# Patient Record
Sex: MALE | Race: ASIAN | NOT HISPANIC OR LATINO | Employment: OTHER | URBAN - METROPOLITAN AREA
[De-identification: names, ages, dates, MRNs, and addresses within clinical notes are randomized per-mention and may not be internally consistent; named-entity substitution may affect disease eponyms.]

---

## 2017-05-06 ENCOUNTER — OFFICE VISIT (OUTPATIENT)
Dept: URGENT CARE | Facility: URGENT CARE | Age: 82
End: 2017-05-06
Payer: COMMERCIAL

## 2017-05-06 VITALS
SYSTOLIC BLOOD PRESSURE: 142 MMHG | BODY MASS INDEX: 25.44 KG/M2 | TEMPERATURE: 96.8 F | HEART RATE: 78 BPM | WEIGHT: 140 LBS | OXYGEN SATURATION: 95 % | DIASTOLIC BLOOD PRESSURE: 79 MMHG

## 2017-05-06 DIAGNOSIS — R42 VERTIGO: Primary | ICD-10-CM

## 2017-05-06 PROCEDURE — 99213 OFFICE O/P EST LOW 20 MIN: CPT | Performed by: NURSE PRACTITIONER

## 2017-05-06 RX ORDER — MECLIZINE HYDROCHLORIDE 25 MG/1
25 TABLET ORAL EVERY 6 HOURS PRN
Qty: 30 TABLET | Refills: 1 | Status: SHIPPED | OUTPATIENT
Start: 2017-05-06 | End: 2019-06-04

## 2017-05-06 NOTE — MR AVS SNAPSHOT
After Visit Summary   5/6/2017    Sergey Felipe    MRN: [id number]           Patient Information     Date Of Birth          1935        Visit Information        Provider Department      5/6/2017 4:50 PM Kelly Johnson NP [facility]        Today's Diagnoses     Vertigo    -  1      Care Instructions      Vertigo (Unknown Cause)    In addition to helping with hearing, the inner ear is part of the balance center of your body. Problems with the inner ear can a false feeling of motion. This is called vertigo. Often, it feels as if you or the room is spinning. A vertigo attack may cause sudden nausea, vomiting and heavy sweating. Severe vertigo causes a loss of balance and can cause you to fall. During vertigo, small head movements and changes in body position will often make the symptoms worse. You may also have ringing in the ears called tinnitus.  An episode of vertigo may last seconds, minutes or hours. Once you are over the first episode, it may never come back. However, symptoms may return off and on.  The cause of your vertigo is not yet known. Possible causes of vertigo include:    Inflammation of the inner ear    Disease of the nerves to the inner ear    Movement of calcium particles in the inner ear    Poor blood flow to the balance centers of the brain    Migraine headaches  Home care    If symptoms are severe, rest quietly in bed. Change positions very slowly. There is usually one position that will feel best, such as lying on one side or lying on your back with your head slightly raised on pillows.    Do not drive a car or work with dangerous machinery until symptoms have been gone for at least one week.    Take medicine as prescribed to relieve your symptoms. Unless another medicine was prescribed for symptoms of nausea, vomiting, and dizziness, you may use over-the-counter motion sickness pills. Ask your pharmacist for suggestions.  Follow-up care  Follow up with your  "healthcare provider or as directed. If you are referred to a specialist or for testing, make the appointment promptly.  When to seek medical advice  Call your healthcare provider if any of the following occur:    Fever of 100.4 F (38 C) or higher, or as directed by your healthcare provider    Vertigo worsens or is not controlled by prescribed medicine     Repeated vomiting not relieved by prescribed medicine     Severe headache    Confusion    Weakness of an arm or leg or one side of the face    Difficulty with speech or vision    Loss of consciousness     Seizure    5063-4847 [employer]. [address] [place] [address]. All rights reserved. This information is not intended as a substitute for professional medical care. Always follow your healthcare professional's instructions.              Follow-ups after your visit        Who to contact     If you have questions or need follow up information about today's clinic visit or your schedule please contact [facility] directly at [phone].  Normal or non-critical lab and imaging results will be communicated to you by MyChart, letter or phone within 4 business days after the clinic has received the results. If you do not hear from us within 7 days, please contact the clinic through [facility] or phone. If you have a critical or abnormal lab result, we will notify you by phone as soon as possible.  Submit refill requests through [employer] or call your pharmacy and they will forward the refill request to us. Please allow 3 business days for your refill to be completed.          Additional Information About Your Visit        [employer]PS Information     [employer] lets you send messages to your doctor, view your test results, renew your prescriptions, schedule appointments and more. To sign up, go to [email or web address] . Click on \"Log in\" on the left side of the screen, which will take you to the Welcome page. Then click on \"Sign up " "Now\" on the right side of the page.     You will be asked to enter the access code listed below, as well as some personal information. Please follow the directions to create your username and password.     Your access code is: [email or web address]  Expires: 2017  5:08 PM     Your access code will  in 90 days. If you need help or a new code, please call your [facility] or [phone].        Care EveryWhere ID     This is your Care EveryWhere ID. This could be used by other organizations to access your [place] medical records  [id number]        Your Vitals Were     Pulse Temperature Pulse Oximetry BMI (Body Mass Index)          78 96.8  F (36  C) (Oral) 95% 25.44 kg/m2         Blood Pressure from Last 3 Encounters:   17 142/79   09/26/15 153/79   02/26/15 134/70    Weight from Last 3 Encounters:   17 140 lb (63.5 kg)   09/26/15 143 lb 12.8 oz (65.2 kg)   02/26/15 134 lb (60.8 kg)              Today, you had the following     No orders found for display         Today's Medication Changes          These changes are accurate as of: 17  5:08 PM.  If you have any questions, ask your nurse or doctor.               Start taking these medicines.        Dose/Directions    meclizine 25 MG tablet   Commonly known as:  ANTIVERT   Used for:  Vertigo   Started by:  Kelly Johnson NP        Dose:  25 mg   Take 1 tablet (25 mg) by mouth every 6 hours as needed for dizziness   Quantity:  30 tablet   Refills:  1            Where to get your medicines      These medications were sent to [id number] - [facility] [address] [facility]  [address] [facility] [address]    Hours:  24-hours Phone:  [phone]     meclizine 25 MG tablet                Primary Care Provider Office Phone # Fax #    Dima Jessica Mcdonald [phone]       [address] [address]        Thank you!     Thank you for choosing " [facility]  for your care. Our goal is always to provide you with excellent care. Hearing back from our patients is one way we can continue to improve our services. Please take a few minutes to complete the written survey that you may receive in the mail after your visit with us. Thank you!             Your Updated Medication List - Protect others around you: Learn how to safely use, store and throw away your medicines at www.disposemymeds.org.          This list is accurate as of: 5/6/17  5:08 PM.  Always use your most recent med list.                   Brand Name Dispense Instructions for use    alum & mag hydroxide-simethicone 200-200-20 MG/5ML Susp suspension    MYLANTA/MAALOX     Take 30 mLs by mouth every 4 hours as needed for indigestion       CALCIUM 1200 PO      Take  by mouth.       levofloxacin 500 MG tablet    LEVAQUIN    7 tablet    Take 1 tablet (500 mg) by mouth daily       meclizine 25 MG tablet    ANTIVERT    30 tablet    Take 1 tablet (25 mg) by mouth every 6 hours as needed for dizziness       PEPTO-BISMOL 262 MG/15ML suspension   Generic drug:  bismuth subsalicylate      Take 15 mLs by mouth every 6 hours as needed for indigestion       priLOSEC 40 MG capsule   Generic drug:  omeprazole      Take 40 mg by mouth daily.       vitamin D 1000 UNITS capsule      Take 1 capsule by mouth daily.

## 2017-05-06 NOTE — PROGRESS NOTES
SUBJECTIVE:                                                    Sergey Felipe is a 81 year old male who presents to clinic today for the following health issues:      Dizziness      Duration: 2 days    Description   Feeling faint:  no   Feeling like the surroundings are moving: YES  Loss of consciousness or falls: no     Intensity:  mild    Accompanying signs and symptoms:   Nausea/vomitting: YES  Palpitations: no   Weakness in arms or legs: no   Vision or speech changes: no   Ringing in ears (Tinnitus): no   Hearing loss related to dizziness: YES- sometimes  Other (fevers/chills/sweating/dyspnea): no     History (similar episodes/head trauma/previous evaluation/recent bleeding): None    Precipitating or alleviating factors (new meds/chemicals): None  Worse with activity/head movement: YES    Therapies tried and outcome: None       Problem list and histories reviewed & adjusted, as indicated.  Additional history: as documented    Patient Active Problem List   Diagnosis     CARDIOVASCULAR SCREENING; LDL GOAL LESS THAN 160     Pseudophakia of both eyes     Seasonal allergic conjunctivitis     Dermatochalasis of eyelid     No past surgical history on file.    Social History   Substance Use Topics     Smoking status: Never Smoker     Smokeless tobacco: Never Used     Alcohol use No     Family History   Problem Relation Age of Onset     CANCER No family hx of      DIABETES No family hx of      Hypertension No family hx of      CEREBROVASCULAR DISEASE No family hx of      Thyroid Disease No family hx of      Glaucoma No family hx of      Macular Degeneration No family hx of          Current Outpatient Prescriptions   Medication Sig Dispense Refill     meclizine (ANTIVERT) 25 MG tablet Take 1 tablet (25 mg) by mouth every 6 hours as needed for dizziness 30 tablet 1     levofloxacin (LEVAQUIN) 500 MG tablet Take 1 tablet (500 mg) by mouth daily 7 tablet 0     alum & mag hydroxide-simethicone (MYLANTA/MAALOX) 200-200-20  MG/5ML SUSP Take 30 mLs by mouth every 4 hours as needed for indigestion       bismuth subsalicylate (PEPTO-BISMOL) 262 MG/15ML suspension Take 15 mLs by mouth every 6 hours as needed for indigestion       Calcium Carbonate-Vit D-Min (CALCIUM 1200 PO) Take  by mouth.       Cholecalciferol (VITAMIN D) 1000 UNITS capsule Take 1 capsule by mouth daily.       omeprazole (PRILOSEC) 40 MG capsule Take 40 mg by mouth daily.       Allergies   Allergen Reactions     Tylenol W/Codeine [Acetaminophen-Codeine]        Reviewed and updated as needed this visit by clinical staff       Reviewed and updated as needed this visit by Provider         ROS:  C: NEGATIVE for fever, chills, change in weight  E/M: NEGATIVE for ear, mouth and throat problems  R: NEGATIVE for significant cough or SOB  CV: NEGATIVE for chest pain, palpitations or peripheral edema    OBJECTIVE:                                                    /79 (BP Location: Left arm, Patient Position: Chair, Cuff Size: Adult Large)  Pulse 78  Temp 96.8  F (36  C) (Oral)  Wt 140 lb (63.5 kg)  SpO2 95%  BMI 25.44 kg/m2  Body mass index is 25.44 kg/(m^2).  GENERAL: healthy, alert and no distress  NECK: no adenopathy, no asymmetry, masses, or scars and thyroid normal to palpation  RESP: lungs clear to auscultation - no rales, rhonchi or wheezes  CV: regular rate and rhythm, normal S1 S2, no S3 or S4, no murmur, click or rub, no peripheral edema and peripheral pulses strong  ABDOMEN: soft, nontender, no hepatosplenomegaly, no masses and bowel sounds normal  MS: no gross musculoskeletal defects noted, no edema    Diagnostic Test Results:  none      ASSESSMENT/PLAN:                                                        ICD-10-CM    1. Vertigo R42 meclizine (ANTIVERT) 25 MG tablet     Discussed presentation of vertigo with family. Will try the medication meclizine and follow up with PCP.  Patient declined Sonia-hicks pike maneuver.  Patient educational/instructional  material provided including reasons for follow-up    The patient indicates understanding of these issues and agrees with the plan.       Kelly Johnson NP  [facility]

## 2017-05-06 NOTE — PATIENT INSTRUCTIONS
Vertigo (Unknown Cause)    In addition to helping with hearing, the inner ear is part of the balance center of your body. Problems with the inner ear can a false feeling of motion. This is called vertigo. Often, it feels as if you or the room is spinning. A vertigo attack may cause sudden nausea, vomiting and heavy sweating. Severe vertigo causes a loss of balance and can cause you to fall. During vertigo, small head movements and changes in body position will often make the symptoms worse. You may also have ringing in the ears called tinnitus.  An episode of vertigo may last seconds, minutes or hours. Once you are over the first episode, it may never come back. However, symptoms may return off and on.  The cause of your vertigo is not yet known. Possible causes of vertigo include:    Inflammation of the inner ear    Disease of the nerves to the inner ear    Movement of calcium particles in the inner ear    Poor blood flow to the balance centers of the brain    Migraine headaches  Home care    If symptoms are severe, rest quietly in bed. Change positions very slowly. There is usually one position that will feel best, such as lying on one side or lying on your back with your head slightly raised on pillows.    Do not drive a car or work with dangerous machinery until symptoms have been gone for at least one week.    Take medicine as prescribed to relieve your symptoms. Unless another medicine was prescribed for symptoms of nausea, vomiting, and dizziness, you may use over-the-counter motion sickness pills. Ask your pharmacist for suggestions.  Follow-up care  Follow up with your healthcare provider or as directed. If you are referred to a specialist or for testing, make the appointment promptly.  When to seek medical advice  Call your healthcare provider if any of the following occur:    Fever of 100.4 F (38 C) or higher, or as directed by your healthcare provider    Vertigo worsens or is not controlled  by prescribed medicine     Repeated vomiting not relieved by prescribed medicine     Severe headache    Confusion    Weakness of an arm or leg or one side of the face    Difficulty with speech or vision    Loss of consciousness     Seizure    0311-2515 [employer]. [address] [place] [address], [place] [address]. All rights reserved. This information is not intended as a substitute for professional medical care. Always follow your healthcare professional's instructions.

## 2017-06-04 ENCOUNTER — OFFICE VISIT (OUTPATIENT)
Dept: URGENT CARE | Facility: URGENT CARE | Age: 82
End: 2017-06-04
Payer: COMMERCIAL

## 2017-06-04 VITALS
HEART RATE: 76 BPM | WEIGHT: 141 LBS | BODY MASS INDEX: 25.62 KG/M2 | OXYGEN SATURATION: 96 % | DIASTOLIC BLOOD PRESSURE: 80 MMHG | TEMPERATURE: 97.4 F | SYSTOLIC BLOOD PRESSURE: 150 MMHG

## 2017-06-04 DIAGNOSIS — L50.9 HIVES: Primary | ICD-10-CM

## 2017-06-04 PROCEDURE — 99213 OFFICE O/P EST LOW 20 MIN: CPT | Performed by: FAMILY MEDICINE

## 2017-06-04 RX ORDER — CETIRIZINE HYDROCHLORIDE 10 MG/1
10 TABLET ORAL EVERY EVENING
Qty: 30 TABLET | Refills: 0 | Status: SHIPPED | OUTPATIENT
Start: 2017-06-04 | End: 2019-06-04

## 2017-06-04 RX ORDER — METHYLPREDNISOLONE 4 MG
4 TABLET, DOSE PACK ORAL SEE ADMIN INSTRUCTIONS
Qty: 21 TABLET | Refills: 0 | Status: SHIPPED | OUTPATIENT
Start: 2017-06-04 | End: 2019-06-04 | Stop reason: ALTCHOICE

## 2017-06-04 RX ORDER — TRIAMCINOLONE ACETONIDE 1 MG/G
CREAM TOPICAL 2 TIMES DAILY
Qty: 60 G | Refills: 3 | Status: SHIPPED | OUTPATIENT
Start: 2017-06-04 | End: 2021-07-01

## 2017-06-04 NOTE — MR AVS SNAPSHOT
After Visit Summary   6/4/2017    Sergey Felipe    MRN: [id number]           Patient Information     Date Of Birth          1935        Visit Information        Provider Department      6/4/2017 3:25 PM Simin Archuleta MD [facility]        Today's Diagnoses     Hives    -  1      Care Instructions      Hives (Adult)  Hives are pink or red bumps on the skin. These bumps are also known as  wheals.  The bumps can itch, burn, or sting. Hives can occur anywhere on the body. They vary in size and shape and can form in clusters. Individual hives can appear and go away quickly. New hives may develop as old ones fade. Hives are common and usually harmless. Occasionally hives are a sign of a serious allergy.  Hives are often caused by an allergic reaction. It may be an allergic reaction to foods such as fruit, shellfish, chocolate, nuts, or tomatoes. It may be a reaction to pollens, animal fur, or mold spores. Medications, chemicals, and insect bites can also cause hives. And hives can be caused by hot sun or cold air. The cause of hives can be difficult to find.  You may be given medications to relieve swelling and itching. Follow all instructions when using these medications. The hives will fade in a few days, but can last up to 2 weeks.  Home care  Follow these tips:    Try to find the cause of the hives and eliminate it. Discuss possible causes with your health care provider. Future reactions to the same allergen may be worse.    Don t scratch the hives. Scratching will delay healing. To reduce itching, apply cool, wet compresses to the skin.    Dress in soft, loose cotton clothing.    Don t bathe in hot water. This can make the itching worse.    Apply an ice pack or cool pack wrapped in a thin towel to your skin. This will help reduce redness and itching.    Try a topical spray or cream with benzocaine to help reduce itching.    Use oral diphenhydramine to help reduce itching.  This is an antihistamine you can buy at drug and grocery stores. It can make you sleepy, so use lower doses during the daytime. Or you can use loratadine. This is an antihistamine that will not make you sleepy. Don t use diphenhydramine if you have glaucoma or have trouble urinating because of an enlarged prostate.  Follow-up care  Follow up with your health care provider if your symptoms don't get better in 2 days. Ask your provider about allergy testing if you have had a severe reaction, or have had several episodes of hives. He or she can use the allergy testing to find out what you are allergic to.  When to seek medical advice  Call your health care provider right away if any of these occur:    Fever of 100.4 F (38.0 C) or higher    Swelling of the face, throat, or tongue    Trouble breathing or swallowing    Redness, swelling, or pain    Foul-smelling fluid coming from the rash    Dizziness, weakness, or fainting    3808-9672 [employer]. [address] [place] [address]. All rights reserved. This information is not intended as a substitute for professional medical care. Always follow your healthcare professional's instructions.                Follow-ups after your visit        Who to contact     If you have questions or need follow up information about today's clinic visit or your schedule please contact [facility] directly at [phone].  Normal or non-critical lab and imaging results will be communicated to you by MyChart, letter or phone within 4 business days after the clinic has received the results. If you do not hear from us within 7 days, please contact the clinic through MyChart or phone. If you have a critical or abnormal lab result, we will notify you by phone as soon as possible.  Submit refill requests through [email or web address] or call your pharmacy and they will forward the refill request to us. Please allow 3 business days for your refill to be completed.        "   Additional Information About Your Visit        MyChart Information     [employer] lets you send messages to your doctor, view your test results, renew your prescriptions, schedule appointments and more. To sign up, go to [email or web address] . Click on \"Log in\" on the left side of the screen, which will take you to the Welcome page. Then click on \"Sign up Now\" on the right side of the page.     You will be asked to enter the access code listed below, as well as some personal information. Please follow the directions to create your username and password.     Your access code is: [email or web address]  Expires: 2017  5:08 PM     Your access code will  in 90 days. If you need help or a new code, please call your [place] clinic or [phone].        Care EveryWhere ID     This is your Care EveryWhere ID. This could be used by other organizations to access your [place] medical records  [id number]        Your Vitals Were     Pulse Temperature Pulse Oximetry BMI (Body Mass Index)          76 97.4  F (36.3  C) (Oral) 96% 25.62 kg/m2         Blood Pressure from Last 3 Encounters:   17 150/80   17 142/79   09/26/15 153/79    Weight from Last 3 Encounters:   17 141 lb (64 kg)   17 140 lb (63.5 kg)   09/26/15 143 lb 12.8 oz (65.2 kg)              Today, you had the following     No orders found for display         Today's Medication Changes          These changes are accurate as of: 17  3:45 PM.  If you have any questions, ask your nurse or doctor.               Start taking these medicines.        Dose/Directions    cetirizine 10 MG tablet   Commonly known as:  zyrTEC   Used for:  Hives   Started by:  Simin Archuleta MD        Dose:  10 mg   Take 1 tablet (10 mg) by mouth every evening   Quantity:  30 tablet   Refills:  0       methylPREDNISolone 4 MG tablet   Commonly known as:  MEDROL   Used for:  Hives   Started by:  Simin Archuleta MD        Dose:  4 mg   Take 1 tablet (4 mg) " by mouth See Admin Instructions follow package directions   Quantity:  21 tablet   Refills:  0       triamcinolone 0.1 % cream   Commonly known as:  KENALOG   Used for:  Hives   Started by:  Simin Archuleta MD        Apply topically 2 times daily   Quantity:  60 g   Refills:  3            Where to get your medicines      These medications were sent to [facility] - [facility] [place] - [address] [facility] [address] [facility]  [address] [facility] [address]    Hours:  24-hours Phone:  [phone]     cetirizine 10 MG tablet    methylPREDNISolone 4 MG tablet    triamcinolone 0.1 % cream                Primary Care Provider Office Phone # Fax #    Dima Lopez DO Tam[place] [phone]       [place] [address] [facility] [address]        Thank you!     Thank you for choosing [facility]  for your care. Our goal is always to provide you with excellent care. Hearing back from our patients is one way we can continue to improve our services. Please take a few minutes to complete the written survey that you may receive in the mail after your visit with us. Thank you!             Your Updated Medication List - Protect others around you: Learn how to safely use, store and throw away your medicines at www.disposemymeds.org.          This list is accurate as of: 6/4/17  3:45 PM.  Always use your most recent med list.                   Brand Name Dispense Instructions for use    alum & mag hydroxide-simethicone 200-200-20 MG/5ML Susp suspension    MYLANTA/MAALOX     Take 30 mLs by mouth every 4 hours as needed for indigestion       BENADRYL PO      Take 25 mg by mouth       CALCIUM 1200 PO      Take  by mouth.       cetirizine 10 MG tablet    zyrTEC    30 tablet    Take 1 tablet (10 mg) by mouth every evening       levofloxacin 500 MG tablet    LEVAQUIN    7 tablet    Take 1 tablet (500 mg) by mouth daily       meclizine 25 MG tablet     ANTIVERT    30 tablet    Take 1 tablet (25 mg) by mouth every 6 hours as needed for dizziness       methylPREDNISolone 4 MG tablet    MEDROL    21 tablet    Take 1 tablet (4 mg) by mouth See Admin Instructions follow package directions       PEPTO-BISMOL 262 MG/15ML suspension   Generic drug:  bismuth subsalicylate      Take 15 mLs by mouth every 6 hours as needed for indigestion       priLOSEC 40 MG capsule   Generic drug:  omeprazole      Take 40 mg by mouth daily.       triamcinolone 0.1 % cream    KENALOG    60 g    Apply topically 2 times daily       vitamin D 1000 UNITS capsule      Take 1 capsule by mouth daily.

## 2017-06-04 NOTE — PATIENT INSTRUCTIONS
Hives (Adult)  Hives are pink or red bumps on the skin. These bumps are also known as  wheals.  The bumps can itch, burn, or sting. Hives can occur anywhere on the body. They vary in size and shape and can form in clusters. Individual hives can appear and go away quickly. New hives may develop as old ones fade. Hives are common and usually harmless. Occasionally hives are a sign of a serious allergy.  Hives are often caused by an allergic reaction. It may be an allergic reaction to foods such as fruit, shellfish, chocolate, nuts, or tomatoes. It may be a reaction to pollens, animal fur, or mold spores. Medications, chemicals, and insect bites can also cause hives. And hives can be caused by hot sun or cold air. The cause of hives can be difficult to find.  You may be given medications to relieve swelling and itching. Follow all instructions when using these medications. The hives will fade in a few days, but can last up to 2 weeks.  Home care  Follow these tips:    Try to find the cause of the hives and eliminate it. Discuss possible causes with your health care provider. Future reactions to the same allergen may be worse.    Don t scratch the hives. Scratching will delay healing. To reduce itching, apply cool, wet compresses to the skin.    Dress in soft, loose cotton clothing.    Don t bathe in hot water. This can make the itching worse.    Apply an ice pack or cool pack wrapped in a thin towel to your skin. This will help reduce redness and itching.    Try a topical spray or cream with benzocaine to help reduce itching.    Use oral diphenhydramine to help reduce itching. This is an antihistamine you can buy at drug and grocery stores. It can make you sleepy, so use lower doses during the daytime. Or you can use loratadine. This is an antihistamine that will not make you sleepy. Don t use diphenhydramine if you have glaucoma or have trouble urinating because of an enlarged prostate.  Follow-up care  Follow up  with your health care provider if your symptoms don't get better in 2 days. Ask your provider about allergy testing if you have had a severe reaction, or have had several episodes of hives. He or she can use the allergy testing to find out what you are allergic to.  When to seek medical advice  Call your health care provider right away if any of these occur:    Fever of 100.4 F (38.0 C) or higher    Swelling of the face, throat, or tongue    Trouble breathing or swallowing    Redness, swelling, or pain    Foul-smelling fluid coming from the rash    Dizziness, weakness, or fainting    0928-9650 [employer]. [address] [place] [address], [place] [address]. All rights reserved. This information is not intended as a substitute for professional medical care. Always follow your healthcare professional's instructions.

## 2017-06-04 NOTE — PROGRESS NOTES
Chief Complaint   Patient presents with     Allergic Reaction     Pt c/o itchiness with rash all over body after eating fish last night.       Sergey Felipe is a 81 year old male who presents to the clinic for possible hives.  Symptoms began 1 day ago.  Symptoms include: itching and rash  The patient denies: chest pains, congestion, cough, diaphoresis, shortness of breath, throat tightness and wheezing  Possible trigger(s): started when eating chicken with fish sauce  Additional symptoms: itching, burning  Initial treatment at home included: benadryl,  Little improvement  History of similar allergic reaction: YES  A few months ago had similar reaction after eating beef salad-  Resolved with Benadryl  Past history of intubation: no  Past history of hospitalization secondary to allergies: no    Past Medical History:   Diagnosis Date     Hypertension        ALLERGIES:  Tylenol w/codeine [acetaminophen-codeine]      Current Outpatient Prescriptions on File Prior to Visit:  meclizine (ANTIVERT) 25 MG tablet Take 1 tablet (25 mg) by mouth every 6 hours as needed for dizziness (Patient not taking: Reported on 6/4/2017)   levofloxacin (LEVAQUIN) 500 MG tablet Take 1 tablet (500 mg) by mouth daily (Patient not taking: Reported on 6/4/2017)   alum & mag hydroxide-simethicone (MYLANTA/MAALOX) 200-200-20 MG/5ML SUSP Take 30 mLs by mouth every 4 hours as needed for indigestion   bismuth subsalicylate (PEPTO-BISMOL) 262 MG/15ML suspension Take 15 mLs by mouth every 6 hours as needed for indigestion   Calcium Carbonate-Vit D-Min (CALCIUM 1200 PO) Take  by mouth.   Cholecalciferol (VITAMIN D) 1000 UNITS capsule Take 1 capsule by mouth daily.   omeprazole (PRILOSEC) 40 MG capsule Take 40 mg by mouth daily.     No current facility-administered medications on file prior to visit.     Social History   Substance Use Topics     Smoking status: Never Smoker     Smokeless tobacco: Never Used     Alcohol use No       Family History   Problem  Relation Age of Onset     CANCER No family hx of      DIABETES No family hx of      Hypertension No family hx of      CEREBROVASCULAR DISEASE No family hx of      Thyroid Disease No family hx of      Glaucoma No family hx of      Macular Degeneration No family hx of        ROS:  EYES: NEGATIVE for vision changes or irritation  ENT/MOUTH: NEGATIVE for ear, mouth and throat problems  RESP:NEGATIVE for significant cough or SOB  GI: NEGATIVE for nausea, abdominal pain, heartburn, or change in bowel habits      EXAM:alert and in mild distress  /80 (BP Location: Left arm, Patient Position: Chair, Cuff Size: Adult Regular)  Pulse 76  Temp 97.4  F (36.3  C) (Oral)  Wt 141 lb (64 kg)  SpO2 96%  BMI 25.62 kg/m2    HEENT: PERRLA, EOMI, fundi benign    Ears:  TM's pearly,  No erythema,  No swelling of external auditory canal  Lips with no swelling  Pharynx with no swelling  Tongue with no swelling    RESP: Normal - CTA without rales, rhonchi, or wheezing.  CARDIAC: NORMAL - regular rate and rhythm without murmur.  SKIN:  pink/ red raised  areas of skin  with   swelling  that  is scattered and confluent located on the  face, arms, trunk, and legs.  Streaking/ excoriation from itching is noted   .         Assessment/ Plan  Hives     - triamcinolone (KENALOG) 0.1 % cream; Apply topically 2 times daily  - methylPREDNISolone (MEDROL) 4 MG tablet; Take 1 tablet (4 mg) by mouth See Admin Instructions follow package directions  - cetirizine (ZYRTEC) 10 MG tablet; Take 1 tablet (10 mg) by mouth every evening     Take OTC antihistamine as needed.  We discussed that in emergency situations that the non-sedating antihistamines can be taken temporarily at 2-4 x the normal daily dosing until the allergic reaction resolves.      Medrol dose pack  Triamcinolone cream to apply to areas of uncontrolled itching     Follow-up if worsening or persistent symptoms-  May consider allergy testing in the future to try to identify the allergen  that may have caused the reaction.    To ER or call 911 if extreme shortness of breath, throat swelling    Discussed avoidance of triggers    Given patient information on  hives

## 2017-06-04 NOTE — NURSING NOTE
"Chief Complaint   Patient presents with     Allergic Reaction     Pt c/o itchiness with rash all over body after eating fish last night.       Initial /80 (BP Location: Left arm, Patient Position: Chair, Cuff Size: Adult Regular)  Pulse 76  Temp 97.4  F (36.3  C) (Oral)  Wt 141 lb (64 kg)  SpO2 96%  BMI 25.62 kg/m2 Estimated body mass index is 25.62 kg/(m^2) as calculated from the following:    Height as of 2/26/15: 5' 2.21\" (1.58 m).    Weight as of this encounter: 141 lb (64 kg).  Medication Reconciliation: complete     Mag Bender CMA (AAMA)      "

## 2018-05-17 ENCOUNTER — OFFICE VISIT (OUTPATIENT)
Dept: OPTOMETRY | Facility: CLINIC | Age: 83
End: 2018-05-17
Payer: COMMERCIAL

## 2018-05-17 ENCOUNTER — APPOINTMENT (OUTPATIENT)
Dept: OPTOMETRY | Facility: CLINIC | Age: 83
End: 2018-05-17
Payer: COMMERCIAL

## 2018-05-17 DIAGNOSIS — H02.839 DERMATOCHALASIS OF EYELID: ICD-10-CM

## 2018-05-17 DIAGNOSIS — H52.223 REGULAR ASTIGMATISM OF BOTH EYES: Primary | ICD-10-CM

## 2018-05-17 DIAGNOSIS — H10.10 SEASONAL ALLERGIC CONJUNCTIVITIS: ICD-10-CM

## 2018-05-17 DIAGNOSIS — Z96.1 PSEUDOPHAKIA OF BOTH EYES: ICD-10-CM

## 2018-05-17 PROCEDURE — 92002 INTRM OPH EXAM NEW PATIENT: CPT | Performed by: OPTOMETRIST

## 2018-05-17 PROCEDURE — 92341 FIT SPECTACLES BIFOCAL: CPT | Performed by: OPTOMETRIST

## 2018-05-17 PROCEDURE — 92015 DETERMINE REFRACTIVE STATE: CPT | Performed by: OPTOMETRIST

## 2018-05-17 ASSESSMENT — REFRACTION_MANIFEST
OD_ADD: +2.75
OD_AXIS: 180
OD_SPHERE: PLANO
OS_SPHERE: PLANO
OS_CYLINDER: +1.50
OS_AXIS: 175
OS_CYLINDER: +0.75
OS_ADD: +2.75
OD_SPHERE: -0.75
OS_SPHERE: -0.75
OS_AXIS: 176
OD_CYLINDER: +0.75
OD_AXIS: 007
OD_CYLINDER: +1.25
METHOD_AUTOREFRACTION: 1

## 2018-05-17 ASSESSMENT — REFRACTION_WEARINGRX
OD_ADD: +2.75
OS_SPHERE: PLANO
OD_CYLINDER: +0.75
OD_SPHERE: +0.25
OS_ADD: +2.75
OS_CYLINDER: +1.50
OD_AXIS: 005
SPECS_TYPE: BIFOCAL
OS_AXIS: 175

## 2018-05-17 ASSESSMENT — VISUAL ACUITY
OS_SC+: -2
CORRECTION_TYPE: GLASSES
OS_CC: 20/30-2
OS_CC+: -2
OD_SC: 20/30
OD_CC+: -2
METHOD: SNELLEN - LINEAR
OD_CC: 20/20-2
OS_SC: 20/40
OD_SC+: -2
OD_CC: 20/25
OS_CC: 20/30

## 2018-05-17 ASSESSMENT — EXTERNAL EXAM - LEFT EYE: OS_EXAM: NORMAL

## 2018-05-17 ASSESSMENT — TONOMETRY
OD_IOP_MMHG: SOFT
OS_IOP_MMHG: SOFT
IOP_METHOD: APPLANATION

## 2018-05-17 ASSESSMENT — CONF VISUAL FIELD
OS_NORMAL: 1
OD_NORMAL: 1
METHOD: COUNTING FINGERS

## 2018-05-17 ASSESSMENT — SLIT LAMP EXAM - LIDS
COMMENTS: 2+ DERMATOCHALASIS - UPPER LID
COMMENTS: 3+ DERMATOCHALASIS - UPPER LID

## 2018-05-17 ASSESSMENT — EXTERNAL EXAM - RIGHT EYE: OD_EXAM: NORMAL

## 2018-05-17 ASSESSMENT — CUP TO DISC RATIO
OD_RATIO: 0.3
OS_RATIO: 0.3

## 2018-05-17 NOTE — PROGRESS NOTES
Chief Complaint   Patient presents with     COMPREHENSIVE EYE EXAM      Accompanied by   Last Eye Exam: 2012  Dilated Previously: Declined dilation    What are you currently using to see?  glasses       Distance Vision Acuity: Noticed gradual change in both eyes    Near Vision Acuity: Not satisfied     Eye Comfort: dry  Do you use eye drops? : Yes: OTC as needed  Occupation or Hobbies: retired    Sandra Morrsi  [employer]            Medical, surgical and family histories reviewed and updated 5/17/2018.       OBJECTIVE: See Ophthalmology exam    ASSESSMENT:    ICD-10-CM    1. Regular astigmatism of both eyes H52.223 EYE EXAM (SIMPLE-NONBILLABLE)     REFRACTION   2. Pseudophakia of both eyes Z96.1 EYE EXAM (SIMPLE-NONBILLABLE)   3. Dermatochalasis of eyelid H02.839 EYE EXAM (SIMPLE-NONBILLABLE)   4. Seasonal allergic conjunctivitis H10.45 EYE EXAM (SIMPLE-NONBILLABLE)      PLAN:     Patient Instructions   There was a change in the prescription for your glasses.    Please return for the pressure check and dilation. It is a more complete exam to check the health of your eyes.    Thank you!

## 2018-05-17 NOTE — MR AVS SNAPSHOT
"              After Visit Summary   5/17/2018    Sergey Felipe    MRN: [id number]           Patient Information     Date Of Birth          1935        Visit Information        Provider Department      5/17/2018 10:25 AM Dorita Morrison, OD; [employer] [facility]        Today's Diagnoses     Regular astigmatism of both eyes    -  1    Pseudophakia of both eyes        Dermatochalasis of eyelid        Seasonal allergic conjunctivitis          Care Instructions    There was a change in the prescription for your glasses.    Please return for the pressure check and dilation. It is a more complete exam to check the health of your eyes.    Thank you!          Follow-ups after your visit        Follow-up notes from your care team     Return in about 1 year (around 5/17/2019) for comprehensive eye exam.      Who to contact     If you have questions or need follow up information about today's clinic visit or your schedule please contact [facility] directly at [phone].  Normal or non-critical lab and imaging results will be communicated to you by MyChart, letter or phone within 4 business days after the clinic has received the results. If you do not hear from us within 7 days, please contact the clinic through [facility] or phone. If you have a critical or abnormal lab result, we will notify you by phone as soon as possible.  Submit refill requests through [employer] or call your pharmacy and they will forward the refill request to us. Please allow 3 business days for your refill to be completed.          Additional Information About Your Visit        [facility] [employer] Information     [employer] lets you send messages to your doctor, view your test results, renew your prescriptions, schedule appointments and more. To sign up, go to [email or web address] . Click on \"Log in\" on the left side of the screen, which will take you to the Welcome page. Then click on \"Sign up Now\" on " the right side of the page.     You will be asked to enter the access code listed below, as well as some personal information. Please follow the directions to create your username and password.     Your access code is: [email or web address]  Expires: 8/15/2018  1:37 PM     Your access code will  in 90 days. If you need help or a new code, please call your [place] clinic or [phone].        Care EveryWhere ID     This is your Care EveryWhere ID. This could be used by other organizations to access your [place] medical records  [id number]         Blood Pressure from Last 3 Encounters:   17 150/80   17 142/79   09/26/15 153/79    Weight from Last 3 Encounters:   17 64 kg (141 lb)   17 63.5 kg (140 lb)   09/26/15 65.2 kg (143 lb 12.8 oz)              We Performed the Following     EYE EXAM (SIMPLE-NONBILLABLE)     REFRACTION        Primary Care Provider Office Phone # Fax #    Dima Jessica Mcdonald,  [phone]       [address] [address]        Equal Access to Services     FAN OCONNELL AH: Hadii aad ku hadasho Soomaali, waaxda luqadaha, qaybta kaalmada adeegyada, carole shepherd. So [facility] [phone].    ATENCIÓN: Si habla español, tiene a howard disposición servicios gratuitos de asistencia lingüística. Crisame al [phone].    We comply with applicable federal civil rights laws and [place] laws. We do not discriminate on the basis of race, color, national origin, age, disability, sex, sexual orientation, or gender identity.            Thank you!     Thank you for choosing [facility]  for your care. Our goal is always to provide you with excellent care. Hearing back from our patients is one way we can continue to improve our services. Please take a few minutes to complete the written survey that you may receive in the mail after your visit with us. Thank you!             Your Updated Medication List  - Protect others around you: Learn how to safely use, store and throw away your medicines at www.disposemymeds.org.          This list is accurate as of 5/17/18  1:37 PM.  Always use your most recent med list.                   Brand Name Dispense Instructions for use Diagnosis    alum & mag hydroxide-simethicone 200-200-20 MG/5ML Susp suspension    MYLANTA/MAALOX     Take 30 mLs by mouth every 4 hours as needed for indigestion        BENADRYL PO      Take 25 mg by mouth        CALCIUM 1200 PO      Take  by mouth.    Acute diarrhea       cetirizine 10 MG tablet    zyrTEC    30 tablet    Take 1 tablet (10 mg) by mouth every evening    Hives       levofloxacin 500 MG tablet    LEVAQUIN    7 tablet    Take 1 tablet (500 mg) by mouth daily    Right lower lobe pneumonia (H)       meclizine 25 MG tablet    ANTIVERT    30 tablet    Take 1 tablet (25 mg) by mouth every 6 hours as needed for dizziness    Vertigo       methylPREDNISolone 4 MG tablet    MEDROL    21 tablet    Take 1 tablet (4 mg) by mouth See Admin Instructions follow package directions    Hives       PEPTO-BISMOL 262 MG/15ML suspension   Generic drug:  bismuth subsalicylate      Take 15 mLs by mouth every 6 hours as needed for indigestion        priLOSEC 40 MG capsule   Generic drug:  omeprazole      Take 40 mg by mouth daily.        triamcinolone 0.1 % cream    KENALOG    60 g    Apply topically 2 times daily    Hives       UNKNOWN TO PATIENT           vitamin D 1000 units capsule      Take 1 capsule by mouth daily.    Acute diarrhea

## 2018-05-17 NOTE — LETTER
5/17/2018         RE: Sergey Felipe  [address]  [facility] [address]        Dear Colleague,    Thank you for referring your patient, Sergey Felipe, to the [facility]. Please see a copy of my visit note below.    Chief Complaint   Patient presents with     COMPREHENSIVE EYE EXAM      Accompanied by   Last Eye Exam: 2012  Dilated Previously: Declined dilation    What are you currently using to see?  glasses       Distance Vision Acuity: Noticed gradual change in both eyes    Near Vision Acuity: Not satisfied     Eye Comfort: dry  Do you use eye drops? : Yes: OTC as needed  Occupation or Hobbies: retired    Sandra Morris  [employer]            Medical, surgical and family histories reviewed and updated 5/17/2018.       OBJECTIVE: See Ophthalmology exam    ASSESSMENT:    ICD-10-CM    1. Regular astigmatism of both eyes H52.223 EYE EXAM (SIMPLE-NONBILLABLE)     REFRACTION   2. Pseudophakia of both eyes Z96.1 EYE EXAM (SIMPLE-NONBILLABLE)   3. Dermatochalasis of eyelid H02.839 EYE EXAM (SIMPLE-NONBILLABLE)   4. Seasonal allergic conjunctivitis H10.45 EYE EXAM (SIMPLE-NONBILLABLE)      PLAN:     Patient Instructions   There was a change in the prescription for your glasses.    Please return for the pressure check and dilation. It is a more complete exam to check the health of your eyes.    Thank you!         Again, thank you for allowing me to participate in the care of your patient.        Sincerely,        Dorita Morrison OD

## 2018-05-17 NOTE — PATIENT INSTRUCTIONS
There was a change in the prescription for your glasses.    Please return for the pressure check and dilation. It is a more complete exam to check the health of your eyes.    Thank you!

## 2018-10-22 ENCOUNTER — OFFICE VISIT (OUTPATIENT)
Dept: URGENT CARE | Facility: URGENT CARE | Age: 83
End: 2018-10-22
Payer: COMMERCIAL

## 2018-10-22 VITALS
BODY MASS INDEX: 25.8 KG/M2 | TEMPERATURE: 97.9 F | WEIGHT: 142 LBS | SYSTOLIC BLOOD PRESSURE: 130 MMHG | DIASTOLIC BLOOD PRESSURE: 72 MMHG | OXYGEN SATURATION: 95 % | HEART RATE: 70 BPM | RESPIRATION RATE: 16 BRPM

## 2018-10-22 DIAGNOSIS — L50.9 URTICARIA: Primary | ICD-10-CM

## 2018-10-22 PROCEDURE — 99213 OFFICE O/P EST LOW 20 MIN: CPT | Performed by: PHYSICIAN ASSISTANT

## 2018-10-22 RX ORDER — DEXAMETHASONE SODIUM PHOSPHATE 4 MG/ML
10 INJECTION, SOLUTION INTRA-ARTICULAR; INTRALESIONAL; INTRAMUSCULAR; INTRAVENOUS; SOFT TISSUE ONCE
Qty: 2.5 ML | Refills: 0 | OUTPATIENT
Start: 2018-10-22 | End: 2019-06-04

## 2018-10-22 RX ORDER — PREDNISONE 20 MG/1
60 TABLET ORAL DAILY
Qty: 15 TABLET | Refills: 0 | Status: SHIPPED | OUTPATIENT
Start: 2018-10-22 | End: 2021-07-01

## 2018-10-22 ASSESSMENT — ENCOUNTER SYMPTOMS
WEAKNESS: 0
VOMITING: 0
RESPIRATORY NEGATIVE: 1
ABDOMINAL PAIN: 0
EYES NEGATIVE: 1
SORE THROAT: 0
WHEEZING: 0
CARDIOVASCULAR NEGATIVE: 1
FREQUENCY: 0
FEVER: 0
ADENOPATHY: 0
DIZZINESS: 0
COUGH: 0
PALPITATIONS: 0
DIAPHORESIS: 0
SHORTNESS OF BREATH: 0
CONSTITUTIONAL NEGATIVE: 1
MYALGIAS: 0
EYE ITCHING: 0
GASTROINTESTINAL NEGATIVE: 1
EYE DISCHARGE: 0
CHEST TIGHTNESS: 0
RHINORRHEA: 0
EYE REDNESS: 0
ENDOCRINE NEGATIVE: 1
NAUSEA: 0
POLYDIPSIA: 0
LIGHT-HEADEDNESS: 0
DIARRHEA: 0
CHILLS: 0
DYSURIA: 0
NEUROLOGICAL NEGATIVE: 1
HEMATURIA: 0
HEADACHES: 0
MUSCULOSKELETAL NEGATIVE: 1

## 2018-10-22 NOTE — PROGRESS NOTES
Chief Complaint:    Chief Complaint   Patient presents with     Derm Problem     Possible rash from fish/salad, x3 days all over body       HPI: Sergey Felipe is an 83 year old male who presents for evaluation and treatment of rash.  He is here with his daughter today who is translating.  The rash started 3 days ago and has not spread.  He has had this happen in the past and prednisone has worked well.  He thinks it may have been something he ate at a  this weekend.  The rash is itchy in nature.  He did try Benadryl and this did help to a small extent.        ROS:      Review of Systems   Constitutional: Negative.  Negative for chills, diaphoresis and fever.   HENT: Negative.  Negative for congestion, ear pain, rhinorrhea and sore throat.    Eyes: Negative.  Negative for discharge, redness and itching.   Respiratory: Negative.  Negative for cough, chest tightness, shortness of breath and wheezing.    Cardiovascular: Negative.  Negative for chest pain and palpitations.   Gastrointestinal: Negative.  Negative for abdominal pain, diarrhea, nausea and vomiting.   Endocrine: Negative.  Negative for polydipsia and polyuria.   Genitourinary: Negative for dysuria, frequency, hematuria and urgency.   Musculoskeletal: Negative.  Negative for myalgias.   Skin: Positive for rash.   Allergic/Immunologic: Negative for immunocompromised state.   Neurological: Negative.  Negative for dizziness, weakness, light-headedness and headaches.   Hematological: Negative for adenopathy.        Family History   Family History   Problem Relation Age of Onset     Cancer No family hx of      Diabetes No family hx of      Hypertension No family hx of      Cerebrovascular Disease No family hx of      Thyroid Disease No family hx of      Glaucoma No family hx of      Macular Degeneration No family hx of        Social History  Social History     Social History     Marital status:      Spouse name: N/A     Number of children: N/A      Years of education: N/A     Occupational History     Not on file.     Social History Main Topics     Smoking status: Never Smoker     Smokeless tobacco: Never Used     Alcohol use No     Drug use: No     Sexual activity: Not on file     Other Topics Concern     Not on file     Social History Narrative        Surgical History:  Past Surgical History:   Procedure Laterality Date     CATARACT IOL, RT/LT          Problem List:  Patient Active Problem List   Diagnosis     CARDIOVASCULAR SCREENING; LDL GOAL LESS THAN 160     Pseudophakia of both eyes     Seasonal allergic conjunctivitis     Dermatochalasis of eyelid        Allergies:  Allergies   Allergen Reactions     Tylenol W/Codeine [Acetaminophen-Codeine]         Current Meds:    Current Outpatient Prescriptions:      alum & mag hydroxide-simethicone (MYLANTA/MAALOX) 200-200-20 MG/5ML SUSP, Take 30 mLs by mouth every 4 hours as needed for indigestion, Disp: , Rfl:      bismuth subsalicylate (PEPTO-BISMOL) 262 MG/15ML suspension, Take 15 mLs by mouth every 6 hours as needed for indigestion, Disp: , Rfl:      Calcium Carbonate-Vit D-Min (CALCIUM 1200 PO), Take  by mouth., Disp: , Rfl:      cetirizine (ZYRTEC) 10 MG tablet, Take 1 tablet (10 mg) by mouth every evening, Disp: 30 tablet, Rfl: 0     Cholecalciferol (VITAMIN D) 1000 UNITS capsule, Take 1 capsule by mouth daily., Disp: , Rfl:      dexamethasone (DECADRON) 4 MG/ML injection, Inject 2.5 mLs (10 mg) as directed once for 1 dose Use 4 mg or dose determined by provider for iontophoresis., Disp: 2.5 mL, Rfl: 0     DiphenhydrAMINE HCl (BENADRYL PO), Take 25 mg by mouth, Disp: , Rfl:      methylPREDNISolone (MEDROL) 4 MG tablet, Take 1 tablet (4 mg) by mouth See Admin Instructions follow package directions, Disp: 21 tablet, Rfl: 0     omeprazole (PRILOSEC) 40 MG capsule, Take 40 mg by mouth daily., Disp: , Rfl:      predniSONE (DELTASONE) 20 MG tablet, Take 3 tablets (60 mg) by mouth daily, Disp: 15 tablet, Rfl: 0      triamcinolone (KENALOG) 0.1 % cream, Apply topically 2 times daily, Disp: 60 g, Rfl: 3     UNKNOWN TO PATIENT, , Disp: , Rfl:      levofloxacin (LEVAQUIN) 500 MG tablet, Take 1 tablet (500 mg) by mouth daily (Patient not taking: Reported on 6/4/2017), Disp: 7 tablet, Rfl: 0     meclizine (ANTIVERT) 25 MG tablet, Take 1 tablet (25 mg) by mouth every 6 hours as needed for dizziness (Patient not taking: Reported on 6/4/2017), Disp: 30 tablet, Rfl: 1     PHYSICAL EXAM:     Vital signs noted and reviewed by Charles Chavez  /72 (BP Location: Left arm, Patient Position: Chair, Cuff Size: Adult Regular)  Pulse 70  Temp 97.9  F (36.6  C) (Oral)  Resp 16  Wt 142 lb (64.4 kg)  SpO2 95%  BMI 25.8 kg/m2     PEFR:    Physical Exam   Constitutional: He appears well-developed and well-nourished. No distress.   HENT:   Head: Normocephalic and atraumatic.   Right Ear: Tympanic membrane and external ear normal.   Left Ear: Tympanic membrane and external ear normal.   Mouth/Throat: Oropharynx is clear and moist.   Eyes: EOM are normal. Pupils are equal, round, and reactive to light.   Neck: Normal range of motion. Neck supple.   Cardiovascular: Normal rate, regular rhythm, normal heart sounds and intact distal pulses.  Exam reveals no gallop and no friction rub.    No murmur heard.  Pulmonary/Chest: Effort normal and breath sounds normal. No respiratory distress.   Abdominal: Soft. Bowel sounds are normal. He exhibits no distension. There is no tenderness.   Lymphadenopathy:     He has no cervical adenopathy.   Neurological: He is alert. No cranial nerve deficit.   Skin: Skin is warm and dry. No rash noted. He is not diaphoretic.   Erythematous patches on torso and bilateral legs consistent with hives.   Psychiatric: He has a normal mood and affect.   Nursing note and vitals reviewed.       Medical Decision Making:    Differential Diagnosis:  Rash: Atopic dermatitis  Contact dermatitis  Eczema  Hives      ASSESSMENT:      1. Urticaria         PLAN:     Patient given 10 Mg Decadron IM in clinic today.  Rx for Prednisone sent in.  Continue Benadryl for itching PRN  Worrisome symptoms discussed with instructions to go to the ED.  Patient verbalized understanding and agreed with this plan.     Charles Chavez  10/22/2018, 11:12 AM

## 2018-10-22 NOTE — MR AVS SNAPSHOT
"              After Visit Summary   10/22/2018    Sergey Felipe    MRN: [id number]           Patient Information     Date Of Birth          1935        Visit Information        Provider Department      10/22/2018 11:05 AM Charles Chavez PA-C [facility]        Today's Diagnoses     Urticaria    -  1       Follow-ups after your visit        Who to contact     If you have questions or need follow up information about today's clinic visit or your schedule please contact [facility] directly at [phone].  Normal or non-critical lab and imaging results will be communicated to you by Alaihart, letter or phone within 4 business days after the clinic has received the results. If you do not hear from us within 7 days, please contact the clinic through Alaihart or phone. If you have a critical or abnormal lab result, we will notify you by phone as soon as possible.  Submit refill requests through Pecabu or call your pharmacy and they will forward the refill request to us. Please allow 3 business days for your refill to be completed.          Additional Information About Your Visit        MyChart Information     Pecabu lets you send messages to your doctor, view your test results, renew your prescriptions, schedule appointments and more. To sign up, go to [email or web address] . Click on \"Log in\" on the left side of the screen, which will take you to the Welcome page. Then click on \"Sign up Now\" on the right side of the page.     You will be asked to enter the access code listed below, as well as some personal information. Please follow the directions to create your username and password.     Your access code is: [email or web address]  Expires: 2019 11:54 AM     Your access code will  in 90 days. If you need help or a new code, please call your [facility] or [phone].        Care EveryWhere ID     This is your Care EveryWhere ID. This could be used by other " organizations to access your [place] medical records  [id number]        Your Vitals Were     Pulse Temperature Respirations Pulse Oximetry BMI (Body Mass Index)       70 97.9  F (36.6  C) (Oral) 16 95% 25.8 kg/m2        Blood Pressure from Last 3 Encounters:   10/22/18 130/72   06/04/17 150/80   05/06/17 142/79    Weight from Last 3 Encounters:   10/22/18 142 lb (64.4 kg)   06/04/17 141 lb (64 kg)   05/06/17 140 lb (63.5 kg)              Today, you had the following     No orders found for display         Today's Medication Changes          These changes are accurate as of 10/22/18 11:54 AM.  If you have any questions, ask your nurse or doctor.               Start taking these medicines.        Dose/Directions    dexamethasone 4 MG/ML injection   Commonly known as:  DECADRON   Used for:  Urticaria   Started by:  Charles Chavez PA-C        Dose:  10 mg   Inject 2.5 mLs (10 mg) as directed once for 1 dose Use 4 mg or dose determined by provider for iontophoresis.   Quantity:  2.5 mL   Refills:  0       predniSONE 20 MG tablet   Commonly known as:  DELTASONE   Used for:  Urticaria   Started by:  Charles Chavez PA-C        Dose:  60 mg   Take 3 tablets (60 mg) by mouth daily   Quantity:  15 tablet   Refills:  0            Where to get your medicines      These medications were sent to [facility] [address] [place] [address] [address] [place] [address]  [address] [facility] [address]    Hours:  24-hours Phone:  [phone]     predniSONE 20 MG tablet         Some of these will need a paper prescription and others can be bought over the counter.  Ask your nurse if you have questions.     You don't need a prescription for these medications     dexamethasone 4 MG/ML injection                Primary Care Provider Office Phone # Fax #    Dima Mcdonald [phone]       [address] [address]        Equal  Access to Services     [facility]: Hadii paula kaufman paris Kirkpatrick, waaxda luqadaha, qaybta kaalmalizz randlepapitolizz, carole munizcarringtonjayla shepherd. So [facility] [phone].    ATENCIÓN: Si habla jo, tiene a howard disposición servicios gratuitos de asistencia lingüística. Crisame al [phone].    We comply with applicable federal civil rights laws and [place] laws. We do not discriminate on the basis of race, color, national origin, age, disability, sex, sexual orientation, or gender identity.            Thank you!     Thank you for choosing [facility]  for your care. Our goal is always to provide you with excellent care. Hearing back from our patients is one way we can continue to improve our services. Please take a few minutes to complete the written survey that you may receive in the mail after your visit with us. Thank you!             Your Updated Medication List - Protect others around you: Learn how to safely use, store and throw away your medicines at www.disposemymeds.org.          This list is accurate as of 10/22/18 11:54 AM.  Always use your most recent med list.                   Brand Name Dispense Instructions for use Diagnosis    alum & mag hydroxide-simethicone 200-200-20 MG/5ML Susp suspension    MYLANTA/MAALOX     Take 30 mLs by mouth every 4 hours as needed for indigestion        BENADRYL PO      Take 25 mg by mouth        CALCIUM 1200 PO      Take  by mouth.    Acute diarrhea       cetirizine 10 MG tablet    zyrTEC    30 tablet    Take 1 tablet (10 mg) by mouth every evening    Hives       dexamethasone 4 MG/ML injection    DECADRON    2.5 mL    Inject 2.5 mLs (10 mg) as directed once for 1 dose Use 4 mg or dose determined by provider for iontophoresis.    Urticaria       levofloxacin 500 MG tablet    LEVAQUIN    7 tablet    Take 1 tablet (500 mg) by mouth daily    Right lower lobe pneumonia (H)       meclizine 25 MG tablet    ANTIVERT    30 tablet    Take 1 tablet  (25 mg) by mouth every 6 hours as needed for dizziness    Vertigo       methylPREDNISolone 4 MG tablet    MEDROL    21 tablet    Take 1 tablet (4 mg) by mouth See Admin Instructions follow package directions    Hives       PEPTO-BISMOL 262 MG/15ML suspension   Generic drug:  bismuth subsalicylate      Take 15 mLs by mouth every 6 hours as needed for indigestion        predniSONE 20 MG tablet    DELTASONE    15 tablet    Take 3 tablets (60 mg) by mouth daily    Urticaria       priLOSEC 40 MG capsule   Generic drug:  omeprazole      Take 40 mg by mouth daily.        triamcinolone 0.1 % cream    KENALOG    60 g    Apply topically 2 times daily    Hives       UNKNOWN TO PATIENT           vitamin D 1000 units capsule      Take 1 capsule by mouth daily.    Acute diarrhea

## 2018-10-31 ENCOUNTER — OFFICE VISIT (OUTPATIENT)
Dept: URGENT CARE | Facility: URGENT CARE | Age: 83
End: 2018-10-31
Payer: COMMERCIAL

## 2018-10-31 VITALS
RESPIRATION RATE: 18 BRPM | BODY MASS INDEX: 25.44 KG/M2 | WEIGHT: 140 LBS | OXYGEN SATURATION: 96 % | HEART RATE: 83 BPM | SYSTOLIC BLOOD PRESSURE: 133 MMHG | TEMPERATURE: 98.3 F | DIASTOLIC BLOOD PRESSURE: 78 MMHG

## 2018-10-31 DIAGNOSIS — L50.9 URTICARIA: Primary | ICD-10-CM

## 2018-10-31 PROCEDURE — 96372 THER/PROPH/DIAG INJ SC/IM: CPT | Performed by: PHYSICIAN ASSISTANT

## 2018-10-31 PROCEDURE — 99213 OFFICE O/P EST LOW 20 MIN: CPT | Mod: 25 | Performed by: PHYSICIAN ASSISTANT

## 2018-10-31 RX ORDER — PREDNISONE 10 MG/1
TABLET ORAL
Qty: 45 TABLET | Refills: 0 | Status: SHIPPED | OUTPATIENT
Start: 2018-10-31 | End: 2019-06-04 | Stop reason: ALTCHOICE

## 2018-10-31 RX ORDER — DEXAMETHASONE SODIUM PHOSPHATE 4 MG/ML
10 INJECTION, SOLUTION INTRA-ARTICULAR; INTRALESIONAL; INTRAMUSCULAR; INTRAVENOUS; SOFT TISSUE ONCE
Qty: 2.5 ML | Refills: 0 | OUTPATIENT
Start: 2018-10-31 | End: 2019-06-04

## 2018-10-31 ASSESSMENT — ENCOUNTER SYMPTOMS
CARDIOVASCULAR NEGATIVE: 1
POLYDIPSIA: 0
DIZZINESS: 0
SHORTNESS OF BREATH: 0
LIGHT-HEADEDNESS: 0
RESPIRATORY NEGATIVE: 1
ABDOMINAL PAIN: 0
ENDOCRINE NEGATIVE: 1
HEADACHES: 0
MUSCULOSKELETAL NEGATIVE: 1
EYE REDNESS: 0
NEUROLOGICAL NEGATIVE: 1
RHINORRHEA: 0
HEMATURIA: 0
VOMITING: 0
EYE DISCHARGE: 0
ADENOPATHY: 0
FREQUENCY: 0
GASTROINTESTINAL NEGATIVE: 1
CONSTITUTIONAL NEGATIVE: 1
DIARRHEA: 0
NAUSEA: 0
EYES NEGATIVE: 1
PALPITATIONS: 0
DYSURIA: 0
CHEST TIGHTNESS: 0
COUGH: 0
EYE ITCHING: 0
WHEEZING: 0
FEVER: 0
DIAPHORESIS: 0
CHILLS: 0
SORE THROAT: 0
WEAKNESS: 0
MYALGIAS: 0

## 2018-10-31 NOTE — MR AVS SNAPSHOT
"              After Visit Summary   10/31/2018    Sergey Felipe    MRN: [id number]           Patient Information     Date Of Birth          1935        Visit Information        Provider Department      10/31/2018 5:15 PM Charles Chavez PA-C [facility]        Today's Diagnoses     Urticaria    -  1       Follow-ups after your visit        Who to contact     If you have questions or need follow up information about today's clinic visit or your schedule please contact [facility] directly at [phone].  Normal or non-critical lab and imaging results will be communicated to you by Alohar Mobilehart, letter or phone within 4 business days after the clinic has received the results. If you do not hear from us within 7 days, please contact the clinic through Alohar Mobilehart or phone. If you have a critical or abnormal lab result, we will notify you by phone as soon as possible.  Submit refill requests through Zutux or call your pharmacy and they will forward the refill request to us. Please allow 3 business days for your refill to be completed.          Additional Information About Your Visit        MyChart Information     Zutux lets you send messages to your doctor, view your test results, renew your prescriptions, schedule appointments and more. To sign up, go to [email or web address] . Click on \"Log in\" on the left side of the screen, which will take you to the Welcome page. Then click on \"Sign up Now\" on the right side of the page.     You will be asked to enter the access code listed below, as well as some personal information. Please follow the directions to create your username and password.     Your access code is: [email or web address]  Expires: 2019 11:54 AM     Your access code will  in 90 days. If you need help or a new code, please call your [facility] or [phone].        Care EveryWhere ID     This is your Care EveryWhere ID. This could be used by other " organizations to access your [place] medical records  [id number]        Your Vitals Were     Pulse Temperature Respirations Pulse Oximetry BMI (Body Mass Index)       83 98.3  F (36.8  C) (Oral) 18 96% 25.44 kg/m2        Blood Pressure from Last 3 Encounters:   10/31/18 133/78   10/22/18 130/72   06/04/17 150/80    Weight from Last 3 Encounters:   10/31/18 140 lb (63.5 kg)   10/22/18 142 lb (64.4 kg)   06/04/17 141 lb (64 kg)              We Performed the Following     DEXAMETHASONE 1 MG/ML          Today's Medication Changes          These changes are accurate as of 10/31/18  5:55 PM.  If you have any questions, ask your nurse or doctor.               These medicines have changed or have updated prescriptions.        Dose/Directions    * dexamethasone 4 MG/ML injection   Commonly known as:  DECADRON   This may have changed:  Another medication with the same name was added. Make sure you understand how and when to take each.   Used for:  Urticaria   Changed by:  Charles Chavez PA-C        Dose:  10 mg   Inject 2.5 mLs (10 mg) as directed once for 1 dose Use 4 mg or dose determined by provider for iontophoresis.   Quantity:  2.5 mL   Refills:  0       * dexamethasone 4 MG/ML injection   Commonly known as:  DECADRON   This may have changed:  You were already taking a medication with the same name, and this prescription was added. Make sure you understand how and when to take each.   Used for:  Urticaria   Changed by:  Charles Chavez PA-C        Dose:  10 mg   Inject 2.5 mLs (10 mg) as directed once for 1 dose Use 4 mg or dose determined by provider for iontophoresis.   Quantity:  2.5 mL   Refills:  0       * predniSONE 20 MG tablet   Commonly known as:  DELTASONE   This may have changed:  Another medication with the same name was added. Make sure you understand how and when to take each.   Used for:  Urticaria   Changed by:  Charles Chavez PA-C        Dose:  60 mg   Take 3 tablets (60 mg) by mouth  daily   Quantity:  15 tablet   Refills:  0       * predniSONE 10 MG tablet   Commonly known as:  DELTASONE   This may have changed:  You were already taking a medication with the same name, and this prescription was added. Make sure you understand how and when to take each.   Used for:  Urticaria   Changed by:  Charles Chavez PA-C        Take 5 tablets for 3 days, then 4 for 3 days, 3 for 3 days, 2 for 3 days, 1 for 3 days.   Quantity:  45 tablet   Refills:  0       * Notice:  This list has 4 medication(s) that are the same as other medications prescribed for you. Read the directions carefully, and ask your doctor or other care provider to review them with you.         Where to get your medicines      These medications were sent to [facility] [id number] [place] [address]  [address] [facility] [address]    Hours:  24-hours Phone:  [phone]     predniSONE 10 MG tablet         Some of these will need a paper prescription and others can be bought over the counter.  Ask your nurse if you have questions.     You don't need a prescription for these medications     dexamethasone 4 MG/ML injection                Primary Care Provider Office Phone # Fax #    Dima Jhonyamy DO [place] [phone]       [address] [address]        Equal Access to Services     [facility]: Hadii paula kaufman hadasho Soomaali, waaxda luqadaha, qaybta kaalmada adeegyada, carole canseco . So [facility] [phone].    ATENCIÓN: Si habla español, tiene a howard disposición servicios gratuitos de asistencia lingüística. Alivia al [phone].    We comply with applicable federal civil rights laws and [place] laws. We do not discriminate on the basis of race, color, national origin, age, disability, sex, sexual orientation, or gender identity.            Thank you!     [facility] you [facility] [place]  [facility]. Our goal is always to provide you with excellent care. Hearing back from our patients is one way we can continue to improve our services. Please take a few minutes to complete the written survey that you may receive in the mail after your visit with us. Thank you!             Your Updated Medication List - Protect others around you: Learn how to safely use, store and throw away your medicines at www.disposemymeds.org.          This list is accurate as of 10/31/18  5:55 PM.  Always use your most recent med list.                   Brand Name Dispense Instructions for use Diagnosis    alum & mag hydroxide-simethicone 200-200-20 MG/5ML Susp suspension    MYLANTA/MAALOX     Take 30 mLs by mouth every 4 hours as needed for indigestion        BENADRYL PO      Take 25 mg by mouth        CALCIUM 1200 PO      Take  by mouth.    Acute diarrhea       cetirizine 10 MG tablet    zyrTEC    30 tablet    Take 1 tablet (10 mg) by mouth every evening    Hives       * dexamethasone 4 MG/ML injection    DECADRON    2.5 mL    Inject 2.5 mLs (10 mg) as directed once for 1 dose Use 4 mg or dose determined by provider for iontophoresis.    Urticaria       * dexamethasone 4 MG/ML injection    DECADRON    2.5 mL    Inject 2.5 mLs (10 mg) as directed once for 1 dose Use 4 mg or dose determined by provider for iontophoresis.    Urticaria       levofloxacin 500 MG tablet    LEVAQUIN    7 tablet    Take 1 tablet (500 mg) by mouth daily    Right lower lobe pneumonia (H)       meclizine 25 MG tablet    ANTIVERT    30 tablet    Take 1 tablet (25 mg) by mouth every 6 hours as needed for dizziness    Vertigo       methylPREDNISolone 4 MG tablet    MEDROL    21 tablet    Take 1 tablet (4 mg) by mouth See Admin Instructions follow package directions    Hives       PEPTO-BISMOL 262 MG/15ML suspension   Generic drug:  bismuth subsalicylate      Take 15 mLs by mouth every 6 hours as needed for indigestion        *  predniSONE 20 MG tablet    DELTASONE    15 tablet    Take 3 tablets (60 mg) by mouth daily    Urticaria       * predniSONE 10 MG tablet    DELTASONE    45 tablet    Take 5 tablets for 3 days, then 4 for 3 days, 3 for 3 days, 2 for 3 days, 1 for 3 days.    Urticaria       priLOSEC 40 MG capsule   Generic drug:  omeprazole      Take 40 mg by mouth daily.        triamcinolone 0.1 % cream    KENALOG    60 g    Apply topically 2 times daily    Hives       UNKNOWN TO PATIENT           vitamin D 1000 units capsule      Take 1 capsule by mouth daily.    Acute diarrhea       * Notice:  This list has 4 medication(s) that are the same as other medications prescribed for you. Read the directions carefully, and ask your doctor or other care provider to review them with you.

## 2018-10-31 NOTE — NURSING NOTE
The following medication was given:     MEDICATION: Decadron 4mg/mL IM   ROUTE: IM  SITE: Ventrogluteal - Left  DOSE: 2.5ml  LOT #: [id number]  :  fabby  EXPIRATION DATE:  09/2019  NDC#: 93765-474-13    Toyin Kelly CMA

## 2018-10-31 NOTE — PROGRESS NOTES
Chief Complaint:    Chief Complaint   Patient presents with     Derm Problem     ALL OVER BODY., seen last week and shot helped but itching is bad       HPI: Sergey Felipe is an 83 year old male who presents for evaluation and treatment of rash.  Patient was in to this clinic on 10/22 for same.  He has a history of this.  He was given decadron in clinic and started on Prednisone for 5 days.  This did make the symptoms resolve.  He noticed the rash come back yesterday.  He denies any new medications, foods, lotions or detergents.  No other complaints at this time.      ROS:      Review of Systems   Constitutional: Negative.  Negative for chills, diaphoresis and fever.   HENT: Negative.  Negative for congestion, ear pain, rhinorrhea and sore throat.    Eyes: Negative.  Negative for discharge, redness and itching.   Respiratory: Negative.  Negative for cough, chest tightness, shortness of breath and wheezing.    Cardiovascular: Negative.  Negative for chest pain and palpitations.   Gastrointestinal: Negative.  Negative for abdominal pain, diarrhea, nausea and vomiting.   Endocrine: Negative.  Negative for polydipsia and polyuria.   Genitourinary: Negative for dysuria, frequency, hematuria and urgency.   Musculoskeletal: Negative.  Negative for myalgias.   Skin: Positive for rash.   Allergic/Immunologic: Negative for immunocompromised state.   Neurological: Negative.  Negative for dizziness, weakness, light-headedness and headaches.   Hematological: Negative for adenopathy.        Family History   Family History   Problem Relation Age of Onset     Cancer No family hx of      Diabetes No family hx of      Hypertension No family hx of      Cerebrovascular Disease No family hx of      Thyroid Disease No family hx of      Glaucoma No family hx of      Macular Degeneration No family hx of        Social History  Social History     Social History     Marital status:      Spouse name: N/A     Number of children: N/A      Years of education: N/A     Occupational History     Not on file.     Social History Main Topics     Smoking status: Never Smoker     Smokeless tobacco: Never Used     Alcohol use No     Drug use: No     Sexual activity: Not on file     Other Topics Concern     Not on file     Social History Narrative        Surgical History:  Past Surgical History:   Procedure Laterality Date     CATARACT IOL, RT/LT          Problem List:  Patient Active Problem List   Diagnosis     CARDIOVASCULAR SCREENING; LDL GOAL LESS THAN 160     Pseudophakia of both eyes     Seasonal allergic conjunctivitis     Dermatochalasis of eyelid        Allergies:  Allergies   Allergen Reactions     Tylenol W/Codeine [Acetaminophen-Codeine]         Current Meds:    Current Outpatient Prescriptions:      dexamethasone (DECADRON) 4 MG/ML injection, Inject 2.5 mLs (10 mg) as directed once for 1 dose Use 4 mg or dose determined by provider for iontophoresis., Disp: 2.5 mL, Rfl: 0     predniSONE (DELTASONE) 10 MG tablet, Take 5 tablets for 3 days, then 4 for 3 days, 3 for 3 days, 2 for 3 days, 1 for 3 days., Disp: 45 tablet, Rfl: 0     alum & mag hydroxide-simethicone (MYLANTA/MAALOX) 200-200-20 MG/5ML SUSP, Take 30 mLs by mouth every 4 hours as needed for indigestion, Disp: , Rfl:      bismuth subsalicylate (PEPTO-BISMOL) 262 MG/15ML suspension, Take 15 mLs by mouth every 6 hours as needed for indigestion, Disp: , Rfl:      Calcium Carbonate-Vit D-Min (CALCIUM 1200 PO), Take  by mouth., Disp: , Rfl:      cetirizine (ZYRTEC) 10 MG tablet, Take 1 tablet (10 mg) by mouth every evening, Disp: 30 tablet, Rfl: 0     Cholecalciferol (VITAMIN D) 1000 UNITS capsule, Take 1 capsule by mouth daily., Disp: , Rfl:      DiphenhydrAMINE HCl (BENADRYL PO), Take 25 mg by mouth, Disp: , Rfl:      levofloxacin (LEVAQUIN) 500 MG tablet, Take 1 tablet (500 mg) by mouth daily (Patient not taking: Reported on 6/4/2017), Disp: 7 tablet, Rfl: 0     meclizine (ANTIVERT) 25 MG  tablet, Take 1 tablet (25 mg) by mouth every 6 hours as needed for dizziness (Patient not taking: Reported on 6/4/2017), Disp: 30 tablet, Rfl: 1     methylPREDNISolone (MEDROL) 4 MG tablet, Take 1 tablet (4 mg) by mouth See Admin Instructions follow package directions, Disp: 21 tablet, Rfl: 0     omeprazole (PRILOSEC) 40 MG capsule, Take 40 mg by mouth daily., Disp: , Rfl:      predniSONE (DELTASONE) 20 MG tablet, Take 3 tablets (60 mg) by mouth daily, Disp: 15 tablet, Rfl: 0     triamcinolone (KENALOG) 0.1 % cream, Apply topically 2 times daily, Disp: 60 g, Rfl: 3     UNKNOWN TO PATIENT, , Disp: , Rfl:      PHYSICAL EXAM:     Vital signs noted and reviewed by Charles Chavez  /78 (BP Location: Left arm, Patient Position: Chair, Cuff Size: Adult Regular)  Pulse 83  Temp 98.3  F (36.8  C) (Oral)  Resp 18  Wt 140 lb (63.5 kg)  SpO2 96%  BMI 25.44 kg/m2     PEFR:    Physical Exam   Constitutional: He appears well-developed and well-nourished. No distress.   HENT:   Head: Normocephalic and atraumatic.   Right Ear: Tympanic membrane and external ear normal.   Left Ear: Tympanic membrane and external ear normal.   Mouth/Throat: Oropharynx is clear and moist.   Eyes: EOM are normal. Pupils are equal, round, and reactive to light.   Neck: Normal range of motion. Neck supple.   Cardiovascular: Normal rate, regular rhythm, normal heart sounds and intact distal pulses.  Exam reveals no gallop and no friction rub.    No murmur heard.  Pulmonary/Chest: Effort normal and breath sounds normal. No respiratory distress.   Abdominal: Soft. Bowel sounds are normal. He exhibits no distension. There is no tenderness.   Lymphadenopathy:     He has no cervical adenopathy.   Neurological: He is alert. No cranial nerve deficit.   Skin: Skin is warm and dry. No rash noted. He is not diaphoretic.   Erythematous patches on torso consistent with hives.   Psychiatric: He has a normal mood and affect.   Nursing note and vitals  reviewed.         ASSESSMENT:     1. Urticaria           PLAN:     Patient requesting 10 Mg Decadron in clinic.  This was given IM.  Rx for Prednisone taper.  Patient instructed to start a food log, and document any new soaps, lotions, or any new environments he is exposed to.  He was instructed to follow up with his PCP next week.  Worrisome symptoms discussed with instructions to go to the ED.  Patient verbalized understanding and agreed with this plan.     Charles Chavez  10/31/2018, 5:21 PM

## 2019-06-04 ENCOUNTER — APPOINTMENT (OUTPATIENT)
Dept: OPTOMETRY | Facility: CLINIC | Age: 84
End: 2019-06-04
Payer: COMMERCIAL

## 2019-06-04 ENCOUNTER — OFFICE VISIT (OUTPATIENT)
Dept: OPTOMETRY | Facility: CLINIC | Age: 84
End: 2019-06-04
Payer: COMMERCIAL

## 2019-06-04 DIAGNOSIS — Z96.1 PSEUDOPHAKIA OF BOTH EYES: Primary | ICD-10-CM

## 2019-06-04 DIAGNOSIS — H02.839 DERMATOCHALASIS OF EYELID: ICD-10-CM

## 2019-06-04 DIAGNOSIS — H52.223 REGULAR ASTIGMATISM OF BOTH EYES: ICD-10-CM

## 2019-06-04 PROCEDURE — 92015 DETERMINE REFRACTIVE STATE: CPT | Performed by: OPTOMETRIST

## 2019-06-04 PROCEDURE — 92341 FIT SPECTACLES BIFOCAL: CPT | Performed by: OPTOMETRIST

## 2019-06-04 PROCEDURE — 92014 COMPRE OPH EXAM EST PT 1/>: CPT | Performed by: OPTOMETRIST

## 2019-06-04 ASSESSMENT — REFRACTION_WEARINGRX
OD_ADD: +2.75
OD_AXIS: 180
OD_SPHERE: PLANO
OD_CYLINDER: +0.75
OS_SPHERE: PLANO
OS_ADD: +2.75
OS_AXIS: 175
SPECS_TYPE: BIFOCAL
OS_CYLINDER: +0.75

## 2019-06-04 ASSESSMENT — REFRACTION_MANIFEST
OD_SPHERE: -0.50
OS_CYLINDER: +0.75
OS_AXIS: 175
OS_ADD: +3.00
OD_ADD: +3.00
OD_AXIS: 180
OS_SPHERE: -0.25
OD_CYLINDER: +0.75

## 2019-06-04 ASSESSMENT — VISUAL ACUITY
OS_CC: 20/30-2
OS_CC: 20/20
CORRECTION_TYPE: GLASSES
METHOD: SNELLEN - LINEAR
OD_CC: 20/30
OD_SC: 20/50
OD_CC: 20/20-3
OS_SC: 20/50
OD_CC+: -1
OS_CC+: -2

## 2019-06-04 ASSESSMENT — SLIT LAMP EXAM - LIDS: COMMENTS: 2+ DERMATOCHALASIS

## 2019-06-04 ASSESSMENT — CUP TO DISC RATIO
OS_RATIO: 0.3
OD_RATIO: 0.3

## 2019-06-04 ASSESSMENT — CONF VISUAL FIELD
OD_NORMAL: 1
OS_NORMAL: 1

## 2019-06-04 ASSESSMENT — TONOMETRY
IOP_METHOD: TONOPEN
OS_IOP_MMHG: 11
OD_IOP_MMHG: 15

## 2019-06-04 ASSESSMENT — EXTERNAL EXAM - LEFT EYE: OS_EXAM: NORMAL

## 2019-06-04 ASSESSMENT — EXTERNAL EXAM - RIGHT EYE: OD_EXAM: NORMAL

## 2019-06-04 NOTE — PROGRESS NOTES
Chief Complaint   Patient presents with     Annual Eye Exam      Accompanied by   Last Eye Exam: 5-  Dilated Previously: Yes    What are you currently using to see?  glasses       Distance Vision Acuity: Satisfied with vision    Near Vision Acuity: Satisfied with vision while reading  with glasses    Eye Comfort: good  Do you use eye drops? : No  Occupation or Hobbies: retired    Larisa Malcolm Optometric Assistant, A.B.O.C.          Medical, surgical and family histories reviewed and updated 6/4/2019.       OBJECTIVE: See Ophthalmology exam    ASSESSMENT:    ICD-10-CM    1. Pseudophakia of both eyes Z96.1 EYE EXAM (SIMPLE-NONBILLABLE)   2. Regular astigmatism of both eyes H52.223 REFRACTION   3. Dermatochalasis of eyelid H02.839 EYE EXAM (SIMPLE-NONBILLABLE)      PLAN:     Patient Instructions   Recommend new glasses.    Recommend returning for dilated fundus exam. It is a more thorough exam of the retina.    Return in 1 year for a complete eye exam or sooner if needed.    Steve Chacon, OD

## 2019-06-04 NOTE — PATIENT INSTRUCTIONS
Recommend new glasses.    Recommend returning for dilated fundus exam. It is a more thorough exam of the retina.    Return in 1 year for a complete eye exam or sooner if needed.    Steve Chacon, OD

## 2019-06-04 NOTE — LETTER
6/4/2019         RE: Sergey Felipe  [address]  [place] [address]        Dear Colleague,    Thank you for referring your patient, Sergey Felipe, to the [facility]. Please see a copy of my visit note below.    Chief Complaint   Patient presents with     Annual Eye Exam      Accompanied by   Last Eye Exam: 5-  Dilated Previously: Yes    What are you currently using to see?  glasses       Distance Vision Acuity: Satisfied with vision    Near Vision Acuity: Satisfied with vision while reading  with glasses    Eye Comfort: good  Do you use eye drops? : No  Occupation or Hobbies: retired    Larisa Malcolm Optometric Assistant, A.B.O.C.          Medical, surgical and family histories reviewed and updated 6/4/2019.       OBJECTIVE: See Ophthalmology exam    ASSESSMENT:    ICD-10-CM    1. Pseudophakia of both eyes Z96.1 EYE EXAM (SIMPLE-NONBILLABLE)   2. Regular astigmatism of both eyes H52.223 REFRACTION   3. Dermatochalasis of eyelid H02.839 EYE EXAM (SIMPLE-NONBILLABLE)      PLAN:     Patient Instructions   Recommend new glasses.    Recommend returning for dilated fundus exam. It is a more thorough exam of the retina.    Return in 1 year for a complete eye exam or sooner if needed.    Steve Chacon, LIGIA           Again, thank you for allowing me to participate in the care of your patient.        Sincerely,        Steve Chacon, OD

## 2019-10-28 ENCOUNTER — OFFICE VISIT (OUTPATIENT)
Dept: FAMILY MEDICINE | Facility: CLINIC | Age: 84
End: 2019-10-28
Payer: COMMERCIAL

## 2019-10-28 VITALS — TEMPERATURE: 98.5 F | DIASTOLIC BLOOD PRESSURE: 72 MMHG | SYSTOLIC BLOOD PRESSURE: 146 MMHG

## 2019-10-28 DIAGNOSIS — Z71.84 TRAVEL ADVICE ENCOUNTER: Primary | ICD-10-CM

## 2019-10-28 PROCEDURE — 90715 TDAP VACCINE 7 YRS/> IM: CPT | Performed by: NURSE PRACTITIONER

## 2019-10-28 PROCEDURE — 90691 TYPHOID VACCINE IM: CPT | Performed by: NURSE PRACTITIONER

## 2019-10-28 PROCEDURE — 90472 IMMUNIZATION ADMIN EACH ADD: CPT | Performed by: NURSE PRACTITIONER

## 2019-10-28 PROCEDURE — 99402 PREV MED CNSL INDIV APPRX 30: CPT | Mod: 25 | Performed by: NURSE PRACTITIONER

## 2019-10-28 PROCEDURE — 90471 IMMUNIZATION ADMIN: CPT | Performed by: NURSE PRACTITIONER

## 2019-10-28 RX ORDER — ATOVAQUONE AND PROGUANIL HYDROCHLORIDE 250; 100 MG/1; MG/1
1 TABLET, FILM COATED ORAL DAILY
Qty: 15 TABLET | Refills: 0 | Status: SHIPPED | OUTPATIENT
Start: 2019-10-28 | End: 2021-07-01

## 2019-10-28 RX ORDER — AZITHROMYCIN 500 MG/1
500 TABLET, FILM COATED ORAL DAILY
Qty: 3 TABLET | Refills: 0 | Status: SHIPPED | OUTPATIENT
Start: 2019-10-28 | End: 2019-10-31

## 2019-10-28 NOTE — PATIENT INSTRUCTIONS
Today October 28, 2019 you received the    Tetanus (Tdap) Vaccine    Typhoid - injectable. This vaccine is valid for two years.   .    These appointments can be made as a NURSE ONLY visit.    **It is very important for the vaccinations to be given on the scheduled day(s), this helps ensure you receive the full effectiveness of the vaccine.**    Please call [facility] with any questions [phone]    Thank you for visiting [facility]

## 2019-10-28 NOTE — NURSING NOTE
"Chief Complaint   Patient presents with     [facility]     initial BP (!) 146/72 (BP Location: Right arm, Cuff Size: Adult Regular)   Temp 98.5  F (36.9  C) (Oral)  Estimated body mass index is 25.44 kg/m  as calculated from the following:    Height as of 2/26/15: 1.58 m (5' 2.21\").    Weight as of 10/31/18: 63.5 kg (140 lb).  BP completed using cuff size: regular.   R arm      Health Maintenance that is potentially due pending provider review:  NONE    n/a    Shin Santiago ma  "

## 2019-10-28 NOTE — PROGRESS NOTES
Nurse Note      Itinerary:  [place] and [place]      Departure Date: 11/15/19      Return Date:       Length of Trip 2 weeks      Reason for Travel: Tourism    Visiting friends and relatives           Urban or rural: both      Accommodations: Hotel    Family home        IMMUNIZATION HISTORY  Have you received any immunizations within the past 4 weeks?  No  Have you ever fainted from having your blood drawn or from an injection?  No  Have you ever had a fever reaction to vaccination?  No  Have you ever had any bad reaction or side effect from any vaccination?  No  Have you ever had hepatitis A or B vaccine?  Yes  Do you live (or work closely) with anyone who has AIDS, an AIDS-like condition, any other immune disorder or who is on chemotherapy for cancer?  No  Do you have a family history of immunodeficiency?  No  Have you received any injection of immune globulin or any blood products during the past 12 months?  No    Patient roomed by Shin Yee  Sergey Felipe is a 84 year old male seen today with spouse for counsultation for international travel to the stated countries.   Patient will be departing in  2 week(s) and  traveling with spouse.      Patient itinerary :  will be in the [facility] >  [facility]  > [facility] area 3-4 days > [facility] and [facility] region which presents risk for Malaria, Dengue Fever and [place]. exposure.      Patient's activities will include visiting friends and relatives.    Patient's country of birth is [facility] [place] 1978    Special medical concerns: none  Pre-travel questionnaire was completed by patient and reviewed by provider.     Vitals: BP (!) 146/72 (BP Location: Right arm, Cuff Size: Adult Regular)   Temp 98.5  F (36.9  C) (Oral)   BMI= There is no height or weight on file to calculate BMI.    EXAM:  General:  Well-nourished, well-developed in no acute distress.  Appears to be stated age, interacts appropriately and expresses understanding of information  given to patient.    Current Outpatient Medications   Medication Sig Dispense Refill     bismuth subsalicylate (PEPTO-BISMOL) 262 MG/15ML suspension Take 15 mLs by mouth every 6 hours as needed for indigestion       Calcium Carbonate-Vit D-Min (CALCIUM 1200 PO) Take  by mouth.       Cholecalciferol (VITAMIN D) 1000 UNITS capsule Take 1 capsule by mouth daily.       omeprazole (PRILOSEC) 40 MG capsule Take 40 mg by mouth daily.       predniSONE (DELTASONE) 20 MG tablet Take 3 tablets (60 mg) by mouth daily 15 tablet 0     triamcinolone (KENALOG) 0.1 % cream Apply topically 2 times daily 60 g 3     Patient Active Problem List   Diagnosis     CARDIOVASCULAR SCREENING; LDL GOAL LESS THAN 160     Pseudophakia of both eyes     Seasonal allergic conjunctivitis     Dermatochalasis of eyelid     Allergies   Allergen Reactions     Tylenol W/Codeine [Acetaminophen-Codeine]          Immunizations discussed include:   Hepatitis A:  Up to date  Hepatitis B: deferred  Influenza: Up to date  Typhoid: Ordered/given today, risks, benefits and side effects reviewed  Rabies: Declined  reviewed managment of a animal bite or scratch (washing wound, seek medical care within 24 hours for post exposure prophylaxis )  Yellow Fever: Not indicated  Japanese Encephalitis: Not indicated - risk of disease is minimal off season an dlow risk   Meningococcus: Not indicated  Tetanus/Diphtheria/Pertussis: given today  Measles/Mumps/Rubella: Immune by disease history per patient report  Cholera: Not needed  Polio: Up to date  Pneumococcal: Up to date  Varicella: Immune by disease history per patient report  Zostavax:  Up to date  Shingrix: deferred  HPV:  Not indicated  TB:  Low risk     Altitude Exposure on this trip: no  Past tolerance to Altitude: na    ASSESSMENT/PLAN:    ICD-10-CM    1. Travel advice encounter Z71.84 atovaquone-proguanil (MALARONE) 250-100 MG tablet     azithromycin (ZITHROMAX) 500 MG tablet     I have reviewed general  recommendations for safe travel   including: food/water precautions, insect precautions,   roadway safety. Educational materials and [employer] report provided.    Malaraia prophylaxis recommended: Malarone  Symptomatic treatment for traveler's diarrhea: azithromycin  Altitude illness prevention and treatment: none      Evacuation insurance advised and resources were provided to patient.    Total visit time 30 minutes  with over 50% of time spent counseling patient as detailed above.    Susan Le CNP

## 2021-05-31 ENCOUNTER — RECORDS - HEALTHEAST (OUTPATIENT)
Dept: ADMINISTRATIVE | Facility: CLINIC | Age: 86
End: 2021-05-31

## 2021-06-01 ENCOUNTER — RECORDS - HEALTHEAST (OUTPATIENT)
Dept: ADMINISTRATIVE | Facility: CLINIC | Age: 86
End: 2021-06-01

## 2021-07-01 ENCOUNTER — OFFICE VISIT (OUTPATIENT)
Dept: URGENT CARE | Facility: URGENT CARE | Age: 86
End: 2021-07-01
Payer: COMMERCIAL

## 2021-07-01 VITALS
TEMPERATURE: 98.3 F | SYSTOLIC BLOOD PRESSURE: 123 MMHG | OXYGEN SATURATION: 96 % | RESPIRATION RATE: 18 BRPM | HEART RATE: 78 BPM | DIASTOLIC BLOOD PRESSURE: 70 MMHG

## 2021-07-01 DIAGNOSIS — J06.9 VIRAL UPPER RESPIRATORY TRACT INFECTION WITH COUGH: Primary | ICD-10-CM

## 2021-07-01 PROCEDURE — 99213 OFFICE O/P EST LOW 20 MIN: CPT | Performed by: PHYSICIAN ASSISTANT

## 2021-07-01 RX ORDER — GUAIFENESIN/DEXTROMETHORPHAN 100-10MG/5
5 SYRUP ORAL EVERY 4 HOURS PRN
Qty: 118 ML | Refills: 0 | Status: SHIPPED | OUTPATIENT
Start: 2021-07-01 | End: 2023-11-17

## 2021-07-01 ASSESSMENT — ENCOUNTER SYMPTOMS
SORE THROAT: 0
FREQUENCY: 0
HEMATURIA: 0
MYALGIAS: 0
GASTROINTESTINAL NEGATIVE: 1
DIARRHEA: 0
VOMITING: 0
CHILLS: 0
NEUROLOGICAL NEGATIVE: 1
MUSCULOSKELETAL NEGATIVE: 1
ABDOMINAL PAIN: 0
FEVER: 0
CHEST TIGHTNESS: 0
PALPITATIONS: 0
DYSURIA: 0
HEADACHES: 0
COUGH: 1
ALLERGIC/IMMUNOLOGIC NEGATIVE: 1
CONSTITUTIONAL NEGATIVE: 1
SHORTNESS OF BREATH: 0
CARDIOVASCULAR NEGATIVE: 1
NAUSEA: 0
WHEEZING: 0

## 2021-07-01 NOTE — PROGRESS NOTES
Chief Complaint:     Chief Complaint   Patient presents with     Cough     x1 week. Had fever/chills when sx started       No results found for any visits on 07/01/21.    Medical Decision Making:    Vital signs reviewed by Charles Chavez PA-C  /70   Pulse 78   Temp 98.3  F (36.8  C) (Oral)   Resp 18   SpO2 96%     Differential Diagnosis:  URI Adult/Peds:  Bronchitis-viral, Pneumonia and Viral upper respiratory illness        ASSESSMENT    1. Viral upper respiratory tract infection with cough        PLAN    Patient is in no acute distress.    Temp is 98.3 in clinic today, lung sounds were clear, and O2 sats at 96% on RA.    Rest, Push fluids, vaporizer, elevation of head of bed.  Ibuprofen and or Tylenol for any fever or body aches.  Rx for cough suppressant sent in.   If symptoms worsen, recheck immediately otherwise follow up with your PCP in 1 week if symptoms are not improving.  Worrisome symptoms discussed with instructions to go to the ED.  Patient given COVID isolation instructions.  Patient verbalized understanding and agreed with this plan.    Labs:    No results found for any visits on 07/01/21.     Vital signs reviewed by Charles Chavez PA-C  /70   Pulse 78   Temp 98.3  F (36.8  C) (Oral)   Resp 18   SpO2 96%     Current Meds      Current Outpatient Medications:      bismuth subsalicylate (PEPTO-BISMOL) 262 MG/15ML suspension, Take 15 mLs by mouth every 6 hours as needed for indigestion, Disp: , Rfl:      Calcium Carbonate-Vit D-Min (CALCIUM 1200 PO), Take  by mouth., Disp: , Rfl:      Cholecalciferol (VITAMIN D) 1000 UNITS capsule, Take 1 capsule by mouth daily., Disp: , Rfl:      omeprazole (PRILOSEC) 40 MG capsule, Take 40 mg by mouth daily., Disp: , Rfl:       Respiratory History    occasional episodes of bronchitis and pneumonia      SUBJECTIVE    HPI: Sergey Felipe is an 85 year old male who presents with chest congestion and cough nonproductive, occasional.  Symptoms began 1   weeks ago and has unchanged.  There is no shortness of breath, wheezing and chest pain.  Patient is eating and drinking well.  No fever, nausea, vomiting, or diarrhea.    Patient denies any recent travel or exposure to known COVID positive tested individual.      ROS:     Review of Systems   Constitutional: Negative.  Negative for chills and fever.   HENT: Positive for congestion. Negative for sore throat.    Respiratory: Positive for cough. Negative for chest tightness, shortness of breath and wheezing.    Cardiovascular: Negative.  Negative for chest pain and palpitations.   Gastrointestinal: Negative.  Negative for abdominal pain, diarrhea, nausea and vomiting.   Genitourinary: Negative for dysuria, frequency, hematuria and urgency.   Musculoskeletal: Negative.  Negative for myalgias.   Skin: Negative for rash.   Allergic/Immunologic: Negative.  Negative for immunocompromised state.   Neurological: Negative.  Negative for headaches.         Family History   History reviewed. No pertinent family history.     Problem history  Patient Active Problem List   Diagnosis     CARDIOVASCULAR SCREENING; LDL GOAL LESS THAN 160     Pseudophakia of both eyes     Seasonal allergic conjunctivitis     Dermatochalasis of eyelid        Allergies  Allergies   Allergen Reactions     Tylenol W/Codeine [Acetaminophen-Codeine]         Social History  Social History     Socioeconomic History     Marital status: Single     Spouse name: Not on file     Number of children: Not on file     Years of education: Not on file     Highest education level: Not on file   Occupational History     Not on file   Social Needs     Financial resource strain: Not on file     Food insecurity     Worry: Not on file     Inability: Not on file     Transportation needs     Medical: Not on file     Non-medical: Not on file   Tobacco Use     Smoking status: Never Smoker     Smokeless tobacco: Never Used   Substance and Sexual Activity     Alcohol use: No     Drug  use: No     Sexual activity: Not on file   Lifestyle     Physical activity     Days per week: Not on file     Minutes per session: Not on file     Stress: Not on file   Relationships     Social connections     Talks on phone: Not on file     Gets together: Not on file     Attends Latter-day service: Not on file     Active member of club or organization: Not on file     Attends meetings of clubs or organizations: Not on file     Relationship status: Not on file     Intimate partner violence     Fear of current or ex partner: Not on file     Emotionally abused: Not on file     Physically abused: Not on file     Forced sexual activity: Not on file   Other Topics Concern     Parent/sibling w/ CABG, MI or angioplasty before 65F 55M? Not Asked   Social History Narrative     Not on file        OBJECTIVE     Vital signs reviewed by Charles Chavez PA-C  /70   Pulse 78   Temp 98.3  F (36.8  C) (Oral)   Resp 18   SpO2 96%      Physical Exam  Vitals signs reviewed.   Constitutional:       General: He is not in acute distress.     Appearance: He is well-developed. He is not ill-appearing, toxic-appearing or diaphoretic.   HENT:      Head: Normocephalic and atraumatic.      Right Ear: Hearing, tympanic membrane, ear canal and external ear normal. No drainage, swelling or tenderness. Tympanic membrane is not perforated, erythematous, retracted or bulging.      Left Ear: Hearing, tympanic membrane, ear canal and external ear normal. No drainage, swelling or tenderness. Tympanic membrane is not perforated, erythematous, retracted or bulging.      Nose: Mucosal edema, congestion and rhinorrhea present. No nasal tenderness.      Right Turbinates: Not enlarged or swollen.      Left Turbinates: Not enlarged or swollen.      Right Sinus: No maxillary sinus tenderness or frontal sinus tenderness.      Left Sinus: No maxillary sinus tenderness or frontal sinus tenderness.      Mouth/Throat:      Pharynx: Posterior oropharyngeal  erythema present. No pharyngeal swelling, oropharyngeal exudate or uvula swelling.      Tonsils: No tonsillar exudate. 0 on the right. 0 on the left.   Eyes:      General: Lids are normal.         Right eye: No discharge.         Left eye: No discharge.      Conjunctiva/sclera: Conjunctivae normal.      Right eye: Right conjunctiva is not injected. No exudate.     Left eye: Left conjunctiva is not injected. No exudate.     Pupils: Pupils are equal, round, and reactive to light.   Neck:      Musculoskeletal: Normal range of motion and neck supple.   Cardiovascular:      Rate and Rhythm: Normal rate and regular rhythm.      Heart sounds: Normal heart sounds. No murmur. No friction rub. No gallop.    Pulmonary:      Effort: Pulmonary effort is normal. No accessory muscle usage, respiratory distress or retractions.      Breath sounds: Normal breath sounds and air entry. No stridor, decreased air movement or transmitted upper airway sounds. No decreased breath sounds, wheezing, rhonchi or rales.   Chest:      Chest wall: No tenderness.   Abdominal:      General: Bowel sounds are normal. There is no distension.      Palpations: Abdomen is soft. Abdomen is not rigid. There is no mass.      Tenderness: There is no abdominal tenderness. There is no guarding or rebound.   Musculoskeletal: Normal range of motion.   Lymphadenopathy:      Head:      Right side of head: No submental, submandibular, tonsillar, preauricular or posterior auricular adenopathy.      Left side of head: No submental, submandibular, tonsillar, preauricular or posterior auricular adenopathy.      Cervical:      Right cervical: No superficial or posterior cervical adenopathy.     Left cervical: No superficial or posterior cervical adenopathy.   Skin:     General: Skin is warm.      Capillary Refill: Capillary refill takes less than 2 seconds.   Neurological:      Mental Status: He is alert and oriented to person, place, and time.      Cranial Nerves: No  cranial nerve deficit.      Sensory: No sensory deficit.      Motor: No abnormal muscle tone.      Coordination: Coordination normal.      Deep Tendon Reflexes: Reflexes normal.   Psychiatric:         Behavior: Behavior normal. Behavior is cooperative.         Thought Content: Thought content normal.         Judgment: Judgment normal.           Charles Chavez PA-C  7/1/2021, 6:22 PM

## 2021-07-01 NOTE — PATIENT INSTRUCTIONS

## 2021-08-12 ENCOUNTER — OFFICE VISIT (OUTPATIENT)
Dept: OPTOMETRY | Facility: CLINIC | Age: 86
End: 2021-08-12
Payer: COMMERCIAL

## 2021-08-12 DIAGNOSIS — Z96.1 PSEUDOPHAKIA OF BOTH EYES: Primary | ICD-10-CM

## 2021-08-12 DIAGNOSIS — H52.4 PRESBYOPIA: ICD-10-CM

## 2021-08-12 DIAGNOSIS — H52.223 REGULAR ASTIGMATISM OF BOTH EYES: ICD-10-CM

## 2021-08-12 DIAGNOSIS — H02.839 DERMATOCHALASIS OF EYELID: ICD-10-CM

## 2021-08-12 PROCEDURE — 92014 COMPRE OPH EXAM EST PT 1/>: CPT | Performed by: OPTOMETRIST

## 2021-08-12 PROCEDURE — 92015 DETERMINE REFRACTIVE STATE: CPT | Performed by: OPTOMETRIST

## 2021-08-12 RX ORDER — OLOPATADINE HYDROCHLORIDE 2 MG/ML
1 SOLUTION/ DROPS OPHTHALMIC DAILY
Qty: 2.5 ML | Refills: 11 | Status: CANCELLED | OUTPATIENT
Start: 2021-08-12 | End: 2022-08-12

## 2021-08-12 ASSESSMENT — REFRACTION_WEARINGRX
OS_AXIS: 175
OD_SPHERE: -0.50
OD_CYLINDER: +0.75
OS_CYLINDER: +0.75
OD_CYLINDER: +0.75
OS_AXIS: 175
OD_ADD: +3.00
OS_ADD: +3.00
OS_CYLINDER: +0.75
OD_SPHERE: -0.25
OD_AXIS: 180
OD_AXIS: 180
OS_SPHERE: -0.25
OS_SPHERE: -0.25
SPECS_TYPE: BIFOCAL
OS_ADD: +3.00
OD_ADD: +3.00

## 2021-08-12 ASSESSMENT — VISUAL ACUITY
CORRECTION_TYPE: GLASSES
OD_SC: 20/30
METHOD: SNELLEN - LINEAR
OS_CC: 20/40
OD_CC: 20/40
OD_SC+: -2
OD_CC: 20/25
OS_SC: 20/120
OD_SC: 20/80
OS_SC: 20/60
OS_CC: 20/60

## 2021-08-12 ASSESSMENT — SLIT LAMP EXAM - LIDS: COMMENTS: 2+ DERMATOCHALASIS

## 2021-08-12 ASSESSMENT — EXTERNAL EXAM - RIGHT EYE: OD_EXAM: NORMAL

## 2021-08-12 ASSESSMENT — CUP TO DISC RATIO
OD_RATIO: 0.3
OS_RATIO: 0.3

## 2021-08-12 ASSESSMENT — REFRACTION_MANIFEST
OS_AXIS: 175
OS_CYLINDER: +0.75
OD_SPHERE: -0.75
OD_AXIS: 180
OD_ADD: +3.00
OD_CYLINDER: +0.75
OS_ADD: +3.00
OS_SPHERE: -0.50

## 2021-08-12 ASSESSMENT — EXTERNAL EXAM - LEFT EYE: OS_EXAM: NORMAL

## 2021-08-12 ASSESSMENT — CONF VISUAL FIELD
METHOD: COUNTING FINGERS
OS_NORMAL: 1
OD_NORMAL: 1

## 2021-08-12 ASSESSMENT — TONOMETRY
OD_IOP_MMHG: 13
OS_IOP_MMHG: 13
IOP_METHOD: TONOPEN

## 2021-08-12 NOTE — PATIENT INSTRUCTIONS
Monitor dermatochalasis both eyes- patient is not interested in surgery.    Eyeglass prescription given.    Recommend returning for dilated fundus exam. It is a more thorough exam of the retina.    Recommend annual eye exams.    Steve Chacon OD      Optometry Providers       Clinic Locations                                 Telephone Number   Dr. Sierra Sanchez [phone]     [facility] Hours:                [facility] Hours:       [facility] Hours:   [address]   [address] [facility] [address]     [address] [place] [address]   [place] [address]    [place] [address]  Phone: [phone]                    Phone: [phone]     Phone: [phone]                      Monday 8:00-7:00                          Monday 8:00-7:00                          Monday 8:00-7:00              Tuesday 8:00-6:00                          Tuesday 8:00-7:00                          Tuesday 8:00-7:00              Wednesday 8:00-6:00                  Wednesday 8:00-7:00                   Wednesday 8:00-7:00      Thursday 8:00-6:00                        Thursday 8:00-7:00                         Thursday 8:00-7:00            Friday 8:00-5:00                              Friday 8:00-5:00                              Friday 8:00-5:00    [facility] Hours:   [address] [facility] [place] [address]  [phone]    Monday 8:00-7:00  Tuesday 8:00-7:00  Wednesday 8:00-7:00  Thursday 8:00-7:00  Friday 8:00-5:00  Please log on to [email or web address] to order your contact lenses.  The link is found on the [facility] page.  As always, Thank you for trusting us with your health care needs!

## 2021-08-12 NOTE — LETTER
8/12/2021         RE: Sergey Felipe  [address]  [facility] [address]        Dear Colleague,    Thank you for referring your patient, Sergey Felipe, to the [facility]. Please see a copy of my visit note below.    Chief Complaint   Patient presents with     Annual Eye Exam         Last Eye Exam: 06/04/2019  Dilated Previously: Does not want dilation today.    What are you currently using to see?  glasses       Distance Vision Acuity: Satisfied with vision    Near Vision Acuity: Satisfied with vision while reading and using computer with glasses    Eye Comfort: good- no itchy or dry eyes-  Do you use eye drops? : No  Occupation or Hobbies:     Yani Valdovinos          Medical, surgical and family histories reviewed and updated 8/12/2021.       OBJECTIVE: See Ophthalmology exam    ASSESSMENT:    ICD-10-CM    1. Pseudophakia of both eyes  Z96.1 EYE EXAM, EST PATIENT,COMPREHESV   2. Dermatochalasis of eyelid  H02.839 EYE EXAM, EST PATIENT,COMPREHESV   3. Presbyopia  H52.4 REFRACTIVE STATUS   4. Regular astigmatism of both eyes  H52.223 REFRACTIVE STATUS      PLAN:     Patient Instructions   Monitor dermatochalasis both eyes- patient is not interested in surgery.    Eyeglass prescription given.    Recommend returning for dilated fundus exam. It is a more thorough exam of the retina.    Recommend annual eye exams.    Steve Chacon, LIGIA             Again, thank you for allowing me to participate in the care of your patient.        Sincerely,        Steve Chacon, OD

## 2021-08-12 NOTE — PROGRESS NOTES
Chief Complaint   Patient presents with     Annual Eye Exam         Last Eye Exam: 06/04/2019  Dilated Previously: Does not want dilation today.    What are you currently using to see?  glasses       Distance Vision Acuity: Satisfied with vision    Near Vision Acuity: Satisfied with vision while reading and using computer with glasses    Eye Comfort: good- no itchy or dry eyes-  Do you use eye drops? : No  Occupation or Hobbies:     [employer]ani Patterson[employer]          Medical, surgical and family histories reviewed and updated 8/12/2021.       OBJECTIVE: See Ophthalmology exam    ASSESSMENT:    ICD-10-CM    1. Pseudophakia of both eyes  Z96.1 EYE EXAM, EST PATIENT,COMPREHESV   2. Dermatochalasis of eyelid  H02.839 EYE EXAM, EST PATIENT,COMPREHESV   3. Presbyopia  H52.4 REFRACTIVE STATUS   4. Regular astigmatism of both eyes  H52.223 REFRACTIVE STATUS      PLAN:     Patient Instructions   Monitor dermatochalasis both eyes- patient is not interested in surgery.    Eyeglass prescription given.    Recommend returning for dilated fundus exam. It is a more thorough exam of the retina.    Recommend annual eye exams.    Steve Chacon, OD

## 2021-08-20 ENCOUNTER — APPOINTMENT (OUTPATIENT)
Dept: OPTOMETRY | Facility: CLINIC | Age: 86
End: 2021-08-20
Payer: COMMERCIAL

## 2021-08-20 PROCEDURE — 92341 FIT SPECTACLES BIFOCAL: CPT | Performed by: OPTOMETRIST

## 2021-11-29 ENCOUNTER — OFFICE VISIT (OUTPATIENT)
Dept: URGENT CARE | Facility: URGENT CARE | Age: 86
End: 2021-11-29
Payer: COMMERCIAL

## 2021-11-29 VITALS
BODY MASS INDEX: 26.16 KG/M2 | OXYGEN SATURATION: 97 % | WEIGHT: 143 LBS | HEART RATE: 77 BPM | DIASTOLIC BLOOD PRESSURE: 76 MMHG | TEMPERATURE: 98.1 F | SYSTOLIC BLOOD PRESSURE: 165 MMHG

## 2021-11-29 DIAGNOSIS — H10.32 ACUTE BACTERIAL CONJUNCTIVITIS OF LEFT EYE: Primary | ICD-10-CM

## 2021-11-29 PROCEDURE — 99213 OFFICE O/P EST LOW 20 MIN: CPT | Performed by: STUDENT IN AN ORGANIZED HEALTH CARE EDUCATION/TRAINING PROGRAM

## 2021-11-29 RX ORDER — POLYMYXIN B SULFATE AND TRIMETHOPRIM 1; 10000 MG/ML; [USP'U]/ML
1-2 SOLUTION OPHTHALMIC EVERY 4 HOURS
Qty: 10 ML | Refills: 0 | Status: SHIPPED | OUTPATIENT
Start: 2021-11-29 | End: 2021-12-06

## 2021-11-29 ASSESSMENT — PAIN SCALES - GENERAL: PAINLEVEL: NO PAIN (0)

## 2021-11-29 NOTE — PROGRESS NOTES
Assessment & Plan     Acute bacterial conjunctivitis of left eye  Start treatment with antibiotic eye drops. If any worsening of symptoms or new eye symptoms, advised to see the eye doctor right away. If symptoms persist also recommend he see an eye doctor. Patient verbalized understanding.  - trimethoprim-polymyxin b (POLYTRIM) 68558-4.1 UNIT/ML-% ophthalmic solution  Dispense: 10 mL; Refill: 0         No follow-ups on file.    Melisa Hernandez, PRERNA [facility] B[facility]A Rincon is a 86 year old male who presents to clinic today for the following health issues:  Chief Complaint   Patient presents with     Eye Problem Left Eye     Started about 4 days ago     HPI    Very mild pain.  No vision changes.  Is glued shut in the mornings.     Review of Systems  Constitutional, HEENT, cardiovascular, pulmonary, gi and gu systems are negative, except as otherwise noted.      Objective    BP (!) 165/76 (BP Location: Left arm, Patient Position: Chair, Cuff Size: Adult Regular)   Pulse 77   Temp 98.1  F (36.7  C) (Tympanic)   Wt 64.9 kg (143 lb)   SpO2 97%   BMI 26.16 kg/m    Physical Exam   GENERAL APPEARANCE: healthy, alert and no distress  EYES: PERRL and left conjunctiva and sclera injected  PSYCH: mentation appears normal and affect normal/bright

## 2022-08-29 ENCOUNTER — OFFICE VISIT (OUTPATIENT)
Dept: URGENT CARE | Facility: URGENT CARE | Age: 87
End: 2022-08-29
Payer: COMMERCIAL

## 2022-08-29 VITALS
OXYGEN SATURATION: 98 % | BODY MASS INDEX: 25.77 KG/M2 | SYSTOLIC BLOOD PRESSURE: 154 MMHG | DIASTOLIC BLOOD PRESSURE: 84 MMHG | WEIGHT: 140.9 LBS | HEART RATE: 88 BPM | TEMPERATURE: 98.4 F

## 2022-08-29 DIAGNOSIS — J06.9 URI, ACUTE: Primary | ICD-10-CM

## 2022-08-29 PROCEDURE — 99213 OFFICE O/P EST LOW 20 MIN: CPT | Mod: CS | Performed by: FAMILY MEDICINE

## 2022-08-29 PROCEDURE — U0005 INFEC AGEN DETEC AMPLI PROBE: HCPCS | Performed by: FAMILY MEDICINE

## 2022-08-29 PROCEDURE — U0003 INFECTIOUS AGENT DETECTION BY NUCLEIC ACID (DNA OR RNA); SEVERE ACUTE RESPIRATORY SYNDROME CORONAVIRUS 2 (SARS-COV-2) (CORONAVIRUS DISEASE [COVID-19]), AMPLIFIED PROBE TECHNIQUE, MAKING USE OF HIGH THROUGHPUT TECHNOLOGIES AS DESCRIBED BY CMS-2020-01-R: HCPCS | Performed by: FAMILY MEDICINE

## 2022-08-29 RX ORDER — BENZONATATE 100 MG/1
100-200 CAPSULE ORAL 3 TIMES DAILY PRN
Qty: 50 CAPSULE | Refills: 0 | Status: SHIPPED | OUTPATIENT
Start: 2022-08-29 | End: 2023-11-17

## 2022-08-29 NOTE — PROGRESS NOTES
ICD-10-CM    1. URI, acute  J06.9 benzonatate (TESSALON) 100 MG capsule     Symptomatic; Yes; 8/22/2022 COVID-19 Virus (Coronavirus) by PCR Nose   likely viral. Symptomatic therapy and follow up discussed.    -------------------------------  Sergey Felipe with presents with 6-7 days symptoms including congestion, non productive cough. No fever nor shortness of breath.    Treatment measures tried include Decongestants and Antihistamine.  Exposures--family members with cough. Tested neg at home.      Current Outpatient Medications   Medication Sig Dispense Refill     Cholecalciferol (VITAMIN D) 1000 UNITS capsule Take 1 capsule by mouth daily.       guaiFENesin-dextromethorphan (ROBITUSSIN DM) 100-10 MG/5ML syrup Take 5 mLs by mouth every 4 hours as needed for cough 118 mL 0     bismuth subsalicylate (PEPTO BISMOL) 262 MG/15ML suspension Take 15 mLs by mouth every 6 hours as needed for indigestion (Patient not taking: Reported on 8/29/2022)       Calcium Carbonate-Vit D-Min (CALCIUM 1200 PO) Take  by mouth. (Patient not taking: Reported on 8/29/2022)       omeprazole (PRILOSEC) 40 MG DR capsule Take 40 mg by mouth daily. (Patient not taking: Reported on 8/29/2022)         ROS otherwise negative for resp., ID,  HEENT symptoms.    Objective: BP (!) 154/84 (BP Location: Left arm, Patient Position: Sitting, Cuff Size: Adult Regular)   Pulse 88   Temp 98.4  F (36.9  C) (Oral)   Wt 63.9 kg (140 lb 14.4 oz)   SpO2 98%   BMI 25.77 kg/m    Exam:  GENERAL APPEARANCE: healthy, alert and no distress  EYES: Eyes grossly normal to inspection  HENT: ear canals and TM's normal and nose and mouth without ulcers or lesions  NECK: no adenopathy, no asymmetry, masses, or scars and thyroid normal to palpation  RESP: lungs clear to auscultation - no rales, rhonchi or wheezes  CV: regular rates and rhythm, no murmur

## 2022-08-30 LAB — SARS-COV-2 RNA RESP QL NAA+PROBE: NEGATIVE

## 2022-11-03 ENCOUNTER — OFFICE VISIT (OUTPATIENT)
Dept: URGENT CARE | Facility: URGENT CARE | Age: 87
End: 2022-11-03
Payer: COMMERCIAL

## 2022-11-03 VITALS
BODY MASS INDEX: 25.83 KG/M2 | WEIGHT: 141.2 LBS | TEMPERATURE: 97.7 F | DIASTOLIC BLOOD PRESSURE: 81 MMHG | SYSTOLIC BLOOD PRESSURE: 180 MMHG | OXYGEN SATURATION: 97 % | HEART RATE: 68 BPM

## 2022-11-03 DIAGNOSIS — R07.9 CHEST PAIN, UNSPECIFIED TYPE: ICD-10-CM

## 2022-11-03 DIAGNOSIS — I10 BENIGN ESSENTIAL HYPERTENSION: ICD-10-CM

## 2022-11-03 DIAGNOSIS — R42 DIZZINESS: Primary | ICD-10-CM

## 2022-11-03 PROBLEM — E78.5 HYPERLIPIDEMIA: Status: ACTIVE | Noted: 2022-11-03

## 2022-11-03 PROBLEM — J30.2 SEASONAL ALLERGIES: Status: ACTIVE | Noted: 2017-01-30

## 2022-11-03 PROBLEM — K29.40 CHRONIC ATROPHIC GASTRITIS: Status: ACTIVE | Noted: 2022-11-03

## 2022-11-03 PROBLEM — M62.81 MUSCLE WEAKNESS (GENERALIZED): Status: ACTIVE | Noted: 2022-11-03

## 2022-11-03 PROBLEM — K21.9 CHRONIC GERD: Status: ACTIVE | Noted: 2022-03-14

## 2022-11-03 PROCEDURE — 99214 OFFICE O/P EST MOD 30 MIN: CPT | Performed by: PHYSICIAN ASSISTANT

## 2022-11-03 RX ORDER — OMEPRAZOLE 40 MG/1
40 CAPSULE, DELAYED RELEASE ORAL
COMMUNITY
Start: 2022-05-19 | End: 2023-11-17

## 2022-11-03 RX ORDER — SUCRALFATE 1 G/1
TABLET ORAL
COMMUNITY
Start: 2022-05-31 | End: 2023-11-17

## 2022-11-03 ASSESSMENT — ENCOUNTER SYMPTOMS
COUGH: 0
RESPIRATORY NEGATIVE: 1
PALPITATIONS: 0
DIARRHEA: 0
CHEST TIGHTNESS: 0
HEADACHES: 0
CONSTITUTIONAL NEGATIVE: 1
HEMATURIA: 0
ABDOMINAL PAIN: 0
FEVER: 0
VOMITING: 0
DIZZINESS: 1
WHEEZING: 0
ALLERGIC/IMMUNOLOGIC NEGATIVE: 1
SHORTNESS OF BREATH: 0
CHILLS: 0
NAUSEA: 0
MUSCULOSKELETAL NEGATIVE: 1
SORE THROAT: 0
GASTROINTESTINAL NEGATIVE: 1
DYSURIA: 0
MYALGIAS: 0
FREQUENCY: 0

## 2022-11-03 NOTE — PROGRESS NOTES
Chief Complaint:    Chief Complaint   Patient presents with     Urgent Care     Dizziness     For 4-5 days      Medical Decision Making:    Vital signs reviewed by Charles Chavez PA-C  BP (!) 180/81 (BP Location: Left arm, Patient Position: Sitting, Cuff Size: Adult Regular)   Pulse 68   Temp 97.7  F (36.5  C) (Tympanic)   Wt 64 kg (141 lb 3.2 oz)   SpO2 97%   BMI 25.83 kg/m      ASSESSMENT:     1. Dizziness    2. Benign essential hypertension       PLAN:     Patient is in no acute distress.  He is afebrile with stable vital signs.  Orthostatic readings were unremarkable.    Unclear etiology at this time.  Differential is large.  At this time, I cannot rule out coronary cause.    Patient instructed to go to the ED now for further evaluation, labs, cardiac workup, and imaging.    Patient instructed to follow up with PCP in the next week..  Patient verbalized understanding and agreed with this plan.    Labs:     No results found for any visits on 11/03/22.    Current Meds:    Current Outpatient Medications:      Cholecalciferol (VITAMIN D) 1000 UNITS capsule, Take 1 capsule by mouth daily, Disp: , Rfl:      omeprazole (PRILOSEC) 40 MG DR capsule, Take 40 mg by mouth, Disp: , Rfl:      benzonatate (TESSALON) 100 MG capsule, Take 1-2 capsules (100-200 mg) by mouth 3 times daily as needed for cough (Patient not taking: Reported on 11/3/2022), Disp: 50 capsule, Rfl: 0     guaiFENesin-dextromethorphan (ROBITUSSIN DM) 100-10 MG/5ML syrup, Take 5 mLs by mouth every 4 hours as needed for cough (Patient not taking: Reported on 11/3/2022), Disp: 118 mL, Rfl: 0     sucralfate (CARAFATE) 1 GM tablet, TAKE 1 TABLET BY MOUTH ONCE DAILY AS NEEDED, Disp: , Rfl:     Allergies:  Allergies   Allergen Reactions     Aspirin      Stomach pain d/t ulcer      Tylenol W/Codeine [Acetaminophen-Codeine]        SUBJECTIVE    HPI: Sergey Felipe is an 87 year old male who presents for evaluation and treatment of chest pain, and dizziness.  Phone  was used for the duration of this visit.  Symptoms started 4-5 days ago.  The chest pain was sharp and burning in nature in the substernal area. He does have a Hx of GERD and takes Prilosec for this.  The chest pain resolved yesterday.  He continues to have dizziness and near syncopal episodes.  Symptoms are worse with standing and walking.  He denies any SOB, or palpitations.  Patient denies any fever, chills, headache, nausea, vomiting, or diarrhea.      ROS:      Review of Systems   Constitutional: Negative.  Negative for chills and fever.   HENT: Negative.  Negative for sore throat.    Respiratory: Negative.  Negative for cough, chest tightness, shortness of breath and wheezing.    Cardiovascular: Positive for chest pain. Negative for palpitations.   Gastrointestinal: Negative.  Negative for abdominal pain, diarrhea, nausea and vomiting.   Genitourinary: Negative for dysuria, frequency, hematuria and urgency.   Musculoskeletal: Negative.  Negative for myalgias.   Skin: Negative for rash.   Allergic/Immunologic: Negative.  Negative for immunocompromised state.   Neurological: Positive for dizziness. Negative for headaches.        Family History   History reviewed. No pertinent family history.    Social History  Social History     Socioeconomic History     Marital status: Single     Spouse name: Not on file     Number of children: Not on file     Years of education: Not on file     Highest education level: Not on file   Occupational History     Not on file   Tobacco Use     Smoking status: Never     Smokeless tobacco: Never   Substance and Sexual Activity     Alcohol use: No     Drug use: No     Sexual activity: Not on file   Other Topics Concern     Parent/sibling w/ CABG, MI or angioplasty before 65F 55M? Not Asked   Social History Narrative     Not on file     Social Determinants of Health     Financial Resource Strain: Not on file   Food Insecurity: Not on file   Transportation Needs: Not on  file   Physical Activity: Not on file   Stress: Not on file   Social Connections: Not on file   Intimate Partner Violence: Not on file   Housing Stability: Not on file        Surgical History:  Past Surgical History:   Procedure Laterality Date     CATARACT IOL, RT/LT       CHOLECYSTECTOMY       HERNIA REPAIR          Problem List:  Patient Active Problem List   Diagnosis     CARDIOVASCULAR SCREENING; LDL GOAL LESS THAN 160     Pseudophakia of both eyes     Seasonal allergic conjunctivitis     Dermatochalasis of eyelid     Chronic atrophic gastritis     Chronic GERD     Chronic lower back pain     Benign essential hypertension     Hyperlipidemia     Muscle weakness (generalized)     Seasonal allergies           OBJECTIVE:     Vital signs noted and reviewed by Charles Chavez PA-C  BP (!) 180/81 (BP Location: Left arm, Patient Position: Sitting, Cuff Size: Adult Regular)   Pulse 68   Temp 97.7  F (36.5  C) (Tympanic)   Wt 64 kg (141 lb 3.2 oz)   SpO2 97%   BMI 25.83 kg/m       PEFR:    Physical Exam  Vitals and nursing note reviewed.   Constitutional:       General: He is not in acute distress.     Appearance: He is well-developed. He is not ill-appearing, toxic-appearing or diaphoretic.   HENT:      Head: Normocephalic and atraumatic.      Right Ear: Hearing, tympanic membrane, ear canal and external ear normal. Tympanic membrane is not perforated, erythematous, retracted or bulging.      Left Ear: Hearing, tympanic membrane, ear canal and external ear normal. Tympanic membrane is not perforated, erythematous, retracted or bulging.      Nose: Nose normal. No mucosal edema, congestion or rhinorrhea.      Mouth/Throat:      Pharynx: No oropharyngeal exudate or posterior oropharyngeal erythema.      Tonsils: No tonsillar exudate or tonsillar abscesses. 0 on the right. 0 on the left.   Eyes:      Pupils: Pupils are equal, round, and reactive to light.   Cardiovascular:      Rate and Rhythm: Normal rate and regular  rhythm.      Heart sounds: Normal heart sounds, S1 normal and S2 normal. Heart sounds not distant. No murmur heard.    No friction rub. No gallop.   Pulmonary:      Effort: Pulmonary effort is normal. No respiratory distress.      Breath sounds: Normal breath sounds. No decreased breath sounds, wheezing, rhonchi or rales.   Abdominal:      General: Bowel sounds are normal. There is no distension.      Palpations: Abdomen is soft.      Tenderness: There is no abdominal tenderness.   Musculoskeletal:      Cervical back: Normal range of motion and neck supple.   Lymphadenopathy:      Cervical: No cervical adenopathy.   Skin:     General: Skin is warm and dry.      Findings: No rash.   Neurological:      Mental Status: He is alert.      Cranial Nerves: No cranial nerve deficit.   Psychiatric:         Attention and Perception: He is attentive.         Speech: Speech normal.         Behavior: Behavior normal. Behavior is cooperative.         Thought Content: Thought content normal.         Judgment: Judgment normal.             Charles Chavez PA-C  11/3/2022, 3:16 PM

## 2022-12-22 ENCOUNTER — APPOINTMENT (OUTPATIENT)
Dept: OPTOMETRY | Facility: CLINIC | Age: 87
End: 2022-12-22
Payer: COMMERCIAL

## 2022-12-22 PROCEDURE — 92341 FIT SPECTACLES BIFOCAL: CPT | Performed by: OPTOMETRIST

## 2023-04-11 ENCOUNTER — LAB (OUTPATIENT)
Dept: LAB | Facility: CLINIC | Age: 88
End: 2023-04-11
Payer: COMMERCIAL

## 2023-04-11 DIAGNOSIS — L28.1 PRURIGO NODULARIS: ICD-10-CM

## 2023-04-11 DIAGNOSIS — L30.9 ACUTE DERMATITIS: Primary | ICD-10-CM

## 2023-04-11 PROCEDURE — 86481 TB AG RESPONSE T-CELL SUSP: CPT

## 2023-04-11 PROCEDURE — 36415 COLL VENOUS BLD VENIPUNCTURE: CPT

## 2023-04-13 LAB
GAMMA INTERFERON BACKGROUND BLD IA-ACNC: 0.26 IU/ML
M TB IFN-G BLD-IMP: NEGATIVE
M TB IFN-G CD4+ BCKGRND COR BLD-ACNC: 9.74 IU/ML
MITOGEN IGNF BCKGRD COR BLD-ACNC: -0.05 IU/ML
MITOGEN IGNF BCKGRD COR BLD-ACNC: -0.07 IU/ML
QUANTIFERON MITOGEN: 10 IU/ML
QUANTIFERON NIL TUBE: 0.26 IU/ML
QUANTIFERON TB1 TUBE: 0.19 IU/ML
QUANTIFERON TB2 TUBE: 0.21

## 2023-06-13 ENCOUNTER — OFFICE VISIT (OUTPATIENT)
Dept: URGENT CARE | Facility: URGENT CARE | Age: 88
End: 2023-06-13
Payer: COMMERCIAL

## 2023-06-13 VITALS
BODY MASS INDEX: 26.45 KG/M2 | RESPIRATION RATE: 18 BRPM | HEART RATE: 72 BPM | WEIGHT: 144.6 LBS | OXYGEN SATURATION: 97 % | TEMPERATURE: 98 F | DIASTOLIC BLOOD PRESSURE: 70 MMHG | SYSTOLIC BLOOD PRESSURE: 134 MMHG

## 2023-06-13 DIAGNOSIS — R30.0 DYSURIA: Primary | ICD-10-CM

## 2023-06-13 LAB
ALBUMIN UR-MCNC: NEGATIVE MG/DL
APPEARANCE UR: ABNORMAL
BACTERIA #/AREA URNS HPF: ABNORMAL /HPF
BILIRUB UR QL STRIP: NEGATIVE
COLOR UR AUTO: YELLOW
GLUCOSE UR STRIP-MCNC: NEGATIVE MG/DL
HGB UR QL STRIP: ABNORMAL
KETONES UR STRIP-MCNC: NEGATIVE MG/DL
LEUKOCYTE ESTERASE UR QL STRIP: ABNORMAL
NITRATE UR QL: NEGATIVE
PH UR STRIP: 6 [PH] (ref 5–7)
RBC #/AREA URNS AUTO: ABNORMAL /HPF
SP GR UR STRIP: 1.01 (ref 1–1.03)
SQUAMOUS #/AREA URNS AUTO: ABNORMAL /LPF
UROBILINOGEN UR STRIP-ACNC: 0.2 E.U./DL
WBC #/AREA URNS AUTO: ABNORMAL /HPF

## 2023-06-13 PROCEDURE — 87088 URINE BACTERIA CULTURE: CPT | Performed by: PHYSICIAN ASSISTANT

## 2023-06-13 PROCEDURE — 87086 URINE CULTURE/COLONY COUNT: CPT | Performed by: PHYSICIAN ASSISTANT

## 2023-06-13 PROCEDURE — 99213 OFFICE O/P EST LOW 20 MIN: CPT | Performed by: PHYSICIAN ASSISTANT

## 2023-06-13 PROCEDURE — 81001 URINALYSIS AUTO W/SCOPE: CPT | Performed by: PHYSICIAN ASSISTANT

## 2023-06-13 PROCEDURE — 87186 SC STD MICRODIL/AGAR DIL: CPT | Performed by: PHYSICIAN ASSISTANT

## 2023-06-13 RX ORDER — SULFAMETHOXAZOLE/TRIMETHOPRIM 800-160 MG
1 TABLET ORAL 2 TIMES DAILY
Qty: 20 TABLET | Refills: 0 | Status: SHIPPED | OUTPATIENT
Start: 2023-06-13 | End: 2023-06-23

## 2023-06-13 ASSESSMENT — ENCOUNTER SYMPTOMS
NAUSEA: 0
PALPITATIONS: 0
CHILLS: 1
FATIGUE: 0
FLANK PAIN: 0
SHORTNESS OF BREATH: 0
WHEEZING: 0
FREQUENCY: 1
DIARRHEA: 0
ABDOMINAL PAIN: 0
DYSURIA: 1
HEMATURIA: 0
CARDIOVASCULAR NEGATIVE: 1
VOMITING: 0
RESPIRATORY NEGATIVE: 1
FEVER: 1
HEMATOCHEZIA: 0
CONSTIPATION: 0
CHEST TIGHTNESS: 0
COUGH: 0

## 2023-06-13 ASSESSMENT — PAIN SCALES - GENERAL: PAINLEVEL: MODERATE PAIN (5)

## 2023-06-13 NOTE — PROGRESS NOTES
Nakia Rincon is a 87 year old, presenting for the following health issues:  Fever and UTI (Fever started yesterday, burning and pain upon urination, needs [facility] [place]  )  HPI   Genitourinary - Male  Onset/Duration: 2days  Description:   Dysuria (painful urination): YES  Hematuria (blood in urine): No  Frequency: YES  Waking at night to urinate: No  Hesitancy (delay in urine): No  Retention (unable to empty): No  Decrease in urinary flow: No  Incontinence: No  Progression of Symptoms:  same  Accompanying Signs & Symptoms:  Fever: YES  Back/Flank pain: No  Urethral discharge: No  Testicle lumps/masses/pain: No  Nausea and/or vomiting: No  Abdominal pain: No  History:   History of frequent UTI s: No  History of kidney stones: YES  History of hernias: No  Personal or Family history of Prostate problems: No  Sexually active: No  Precipitating or alleviating factors: None  Therapies tried and outcome: tylenol, fluids with minimal relief    Patient Active Problem List   Diagnosis     CARDIOVASCULAR SCREENING; LDL GOAL LESS THAN 160     Pseudophakia of both eyes     Seasonal allergic conjunctivitis     Dermatochalasis of eyelid     Chronic atrophic gastritis     Chronic GERD     Chronic lower back pain     Benign essential hypertension     Hyperlipidemia     Muscle weakness (generalized)     Seasonal allergies     Current Outpatient Medications   Medication     benzonatate (TESSALON) 100 MG capsule     Cholecalciferol (VITAMIN D) 1000 UNITS capsule     guaiFENesin-dextromethorphan (ROBITUSSIN DM) 100-10 MG/5ML syrup     omeprazole (PRILOSEC) 40 MG DR capsule     sucralfate (CARAFATE) 1 GM tablet     No current facility-administered medications for this visit.        Allergies   Allergen Reactions     Aspirin      Stomach pain d/t ulcer      Tylenol W/Codeine [Acetaminophen-Codeine]      Review of Systems   Constitutional: Positive for chills and fever. Negative for fatigue.   Respiratory: Negative.  Negative for  cough, chest tightness, shortness of breath and wheezing.    Cardiovascular: Negative.  Negative for chest pain, palpitations and peripheral edema.   Gastrointestinal: Negative for abdominal pain, constipation, diarrhea, hematochezia, nausea and vomiting.   Genitourinary: Positive for dysuria, frequency and urgency. Negative for flank pain, hematuria, penile discharge, penile pain and testicular pain.   All other systems reviewed and are negative.           Objective    /70 (BP Location: Left arm, Patient Position: Sitting, Cuff Size: Adult Small)   Pulse 72   Temp 98  F (36.7  C) (Oral)   Resp 18   Wt 65.6 kg (144 lb 9.6 oz)   SpO2 97%   BMI 26.45 kg/m    Body mass index is 26.45 kg/m .  Physical Exam  Vitals and nursing note reviewed.   Constitutional:       General: He is not in acute distress.     Appearance: Normal appearance. He is well-developed and normal weight. He is not ill-appearing.   Cardiovascular:      Rate and Rhythm: Normal rate and regular rhythm.      Pulses: Normal pulses.      Heart sounds: Normal heart sounds, S1 normal and S2 normal. No murmur heard.     No friction rub. No gallop.   Pulmonary:      Effort: Pulmonary effort is normal. No accessory muscle usage or respiratory distress.      Breath sounds: Normal breath sounds and air entry. No wheezing or rales.   Abdominal:      General: Abdomen is flat. Bowel sounds are normal.      Palpations: Abdomen is soft. There is no mass.      Tenderness: There is no abdominal tenderness. There is no right CVA tenderness, left CVA tenderness, guarding or rebound. Negative signs include Evans's sign, Rovsing's sign and McBurney's sign.      Hernia: No hernia is present.   Skin:     General: Skin is warm and dry.   Neurological:      Mental Status: He is alert and oriented to person, place, and time.   Psychiatric:         Mood and Affect: Mood normal.         Behavior: Behavior normal.         Thought Content: Thought content normal.          Judgment: Judgment normal.       Results for orders placed or performed in visit on 06/13/23 (from the past 24 hour(s))   UA Macroscopic with reflex to Microscopic and Culture    Specimen: Urine, Clean Catch   Result Value Ref Range    Color Urine Yellow Colorless, Straw, Light Yellow, Yellow    Appearance Urine Slightly Cloudy (A) Clear    Glucose Urine Negative Negative mg/dL    Bilirubin Urine Negative Negative    Ketones Urine Negative Negative mg/dL    Specific Gravity Urine 1.015 1.003 - 1.035    Blood Urine Large (A) Negative    pH Urine 6.0 5.0 - 7.0    Protein Albumin Urine Negative Negative mg/dL    Urobilinogen Urine 0.2 0.2, 1.0 E.U./dL    Nitrite Urine Negative Negative    Leukocyte Esterase Urine Large (A) Negative   Urine Microscopic Exam   Result Value Ref Range    Bacteria Urine Moderate (A) None Seen /HPF    RBC Urine 0-2 0-2 /HPF /HPF    WBC Urine 25-50 (A) 0-5 /HPF /HPF    Squamous Epithelials Urine Few (A) None Seen /LPF         Assessment/Plan:  Dysuria:  UA and micro is suspicious for UTI and will empirically treat with bactrim DS [id number].  Will send for UC to help guide abx treatment.  Recommend increase fluids, regular voids, proper wiping techniques and voiding after intercourse.  Educated patient on warning signs of kidney infection and to go to the ER if he develops any of these symptoms.  Recheck in clinic if symptoms worsen or if symptoms do not improve.  -     UA Macroscopic with reflex to Microscopic and Culture; Future  -     UA Macroscopic with reflex to Microscopic and Culture  -     Urine Microscopic Exam  -     Urine Culture  -     sulfamethoxazole-trimethoprim (BACTRIM DS) 800-160 MG tablet; Take 1 tablet by mouth 2 times daily for 10 days        Lisa Ba PA-C

## 2023-06-14 LAB — BACTERIA UR CULT: ABNORMAL

## 2023-07-17 ENCOUNTER — OFFICE VISIT (OUTPATIENT)
Dept: URGENT CARE | Facility: URGENT CARE | Age: 88
End: 2023-07-17
Payer: COMMERCIAL

## 2023-07-17 VITALS
RESPIRATION RATE: 16 BRPM | TEMPERATURE: 97.7 F | OXYGEN SATURATION: 98 % | SYSTOLIC BLOOD PRESSURE: 143 MMHG | HEART RATE: 70 BPM | BODY MASS INDEX: 25.62 KG/M2 | WEIGHT: 140.1 LBS | DIASTOLIC BLOOD PRESSURE: 73 MMHG

## 2023-07-17 DIAGNOSIS — J06.9 VIRAL URI WITH COUGH: Primary | ICD-10-CM

## 2023-07-17 PROCEDURE — 99213 OFFICE O/P EST LOW 20 MIN: CPT | Performed by: PHYSICIAN ASSISTANT

## 2023-07-17 RX ORDER — ALBUTEROL SULFATE 90 UG/1
2 AEROSOL, METERED RESPIRATORY (INHALATION) EVERY 4 HOURS PRN
Qty: 18 G | Refills: 0 | Status: SHIPPED | OUTPATIENT
Start: 2023-07-17

## 2023-07-17 RX ORDER — BENZONATATE 100 MG/1
CAPSULE ORAL
Qty: 30 CAPSULE | Refills: 0 | Status: SHIPPED | OUTPATIENT
Start: 2023-07-17 | End: 2023-11-17

## 2023-07-17 NOTE — PROGRESS NOTES
Assessment & Plan     Viral URI with cough  - benzonatate (TESSALON) 100 MG capsule; Take 1-2 capsules by mouth up to 3 times daily as needed for cough.  - albuterol (PROAIR HFA/PROVENTIL HFA/VENTOLIN HFA) 108 (90 Base) MCG/ACT inhaler; Inhale 2 puffs into the lungs every 4 hours as needed for shortness of breath or wheezing    Lung exam is normal.  We discussed that cough is likely coming from irritation in the throat versus deep in the chest.  Seasonal allergies or postnasal drainage could be contributing.  Poor air quality could also be causing a sore throat and cough.  Albuterol can be used every 4 hours to help open up chest if cough is becoming more persistent.  Tessalon to help calm cough receptors.  We did discuss using Flonase as well to dry up postnasal drainage.  Follow-up to be seen if symptoms worsen or fail to improve in 1 more week.    Return in about 1 week (around 7/24/2023) for visit with primary care provider if not improving.     Simin Charles PA-C  [facility]    Subjective   Sergey Felipe is a 87 year old who presents for the following health issues     Patient presents with:  Pharyngitis: Sore throat since Friday. Pt has been taking robitussin, which is not helping.   Cough  Fatigue  Dizziness    HPI    Sergey presents clinic today with his daughter who is interpreting for him for evaluation of a sore throat.  Symptoms first began 4 days ago.  He had an associated cough, fatigue.  He took Robitussin to help control his cough and its not helping but causing him to feel dizzy.  His cough is gets coming from a tickle in his throat but it also hurts in his chest when he coughs.  Cough has been dry.  No fever.    Review of Systems   ROS negative except as stated above.      Objective    BP (!) 143/73 (BP Location: Left arm, Patient Position: Sitting, Cuff Size: Adult Regular)   Pulse 70   Temp 97.7  F (36.5  C) (Tympanic)   Resp 16   Wt 63.5 kg (140 lb 1.6 oz)   SpO2  98%   BMI 25.62 kg/m    Physical Exam   GENERAL: healthy, alert and no distress  EYES: Eyes grossly normal to inspection, PERRL and conjunctivae and sclerae normal  HENT: ear canals and TM's normal, nose and mouth without ulcers or lesions  NECK: no adenopathy, no asymmetry, masses, or scars and thyroid normal to palpation  RESP: lungs clear to auscultation - no rales, rhonchi or wheezes  CV: regular rate and rhythm, normal S1 S2, no S3 or S4, no murmur, click or rub, no peripheral edema and peripheral pulses strong  SKIN: no suspicious lesions or rashes    No results found for any visits on 07/17/23.

## 2023-07-17 NOTE — PATIENT INSTRUCTIONS
Albuterol 2 puffs every 4 hours as needed for shortness of breath.  Tessalon Perles- take 1-2 capsules 3 times daily as needed for cough.    Flonase (fluticasone) 2 sprays in each nostril daily until symptoms resolve, then continue 1 spray in each nostril for at least 5 more days.  No indication for antibiotics discussed.   Antibiotics cause side effects such as gastrointestinal discomfort, diarrhea, allergic reactions, diaper rash, and yeast infections. Unnecessary use of antibiotics can lead to the development of antibiotic-resistant strains of bacteria.  Rest! Your body needs more rest to heal.  Drink plenty of fluids (warm fluids like tea or soup are soothing and reduce cough)  Sit in the bathroom with a hot shower running and breathe in the steam.  Honey may soothe your sore throat and help manage your cough- may take straight or in warm water with lemon juice.  Avoid smoke (cigarettes, bonfires, fireplace, wood burning stoves).  Take Tylenol as needed for pain.  Mucinex or Robitussin (guiafenesin) thin mucus and may help it to loosen more quickly  Good handwashing is the best way to prevent spread of germs  Present to emergency room if you develop trouble breathing, swallowing or cough-up blood.  Follow up with your primary care provider if symptoms worsen or fail to improve as expected.

## 2023-07-23 ENCOUNTER — ANCILLARY PROCEDURE (OUTPATIENT)
Dept: GENERAL RADIOLOGY | Facility: CLINIC | Age: 88
End: 2023-07-23
Attending: PHYSICIAN ASSISTANT
Payer: COMMERCIAL

## 2023-07-23 ENCOUNTER — OFFICE VISIT (OUTPATIENT)
Dept: URGENT CARE | Facility: URGENT CARE | Age: 88
End: 2023-07-23
Payer: COMMERCIAL

## 2023-07-23 VITALS
DIASTOLIC BLOOD PRESSURE: 72 MMHG | SYSTOLIC BLOOD PRESSURE: 133 MMHG | BODY MASS INDEX: 25.61 KG/M2 | HEART RATE: 80 BPM | RESPIRATION RATE: 12 BRPM | TEMPERATURE: 98.4 F | OXYGEN SATURATION: 97 % | WEIGHT: 140 LBS

## 2023-07-23 DIAGNOSIS — J40 BRONCHITIS: ICD-10-CM

## 2023-07-23 DIAGNOSIS — R05.1 ACUTE COUGH: Primary | ICD-10-CM

## 2023-07-23 LAB
BASOPHILS # BLD AUTO: 0 10E3/UL (ref 0–0.2)
BASOPHILS NFR BLD AUTO: 0 %
EOSINOPHIL # BLD AUTO: 0.3 10E3/UL (ref 0–0.7)
EOSINOPHIL NFR BLD AUTO: 3 %
ERYTHROCYTE [DISTWIDTH] IN BLOOD BY AUTOMATED COUNT: 11.7 % (ref 10–15)
HCT VFR BLD AUTO: 46.4 % (ref 40–53)
HGB BLD-MCNC: 15.9 G/DL (ref 13.3–17.7)
IMM GRANULOCYTES # BLD: 0 10E3/UL
IMM GRANULOCYTES NFR BLD: 0 %
LYMPHOCYTES # BLD AUTO: 2.2 10E3/UL (ref 0.8–5.3)
LYMPHOCYTES NFR BLD AUTO: 21 %
MCH RBC QN AUTO: 31.5 PG (ref 26.5–33)
MCHC RBC AUTO-ENTMCNC: 34.3 G/DL (ref 31.5–36.5)
MCV RBC AUTO: 92 FL (ref 78–100)
MONOCYTES # BLD AUTO: 1.2 10E3/UL (ref 0–1.3)
MONOCYTES NFR BLD AUTO: 11 %
NEUTROPHILS # BLD AUTO: 7.2 10E3/UL (ref 1.6–8.3)
NEUTROPHILS NFR BLD AUTO: 65 %
PLATELET # BLD AUTO: 206 10E3/UL (ref 150–450)
RBC # BLD AUTO: 5.05 10E6/UL (ref 4.4–5.9)
WBC # BLD AUTO: 10.9 10E3/UL (ref 4–11)

## 2023-07-23 PROCEDURE — 36415 COLL VENOUS BLD VENIPUNCTURE: CPT | Performed by: PHYSICIAN ASSISTANT

## 2023-07-23 PROCEDURE — 85025 COMPLETE CBC W/AUTO DIFF WBC: CPT | Performed by: PHYSICIAN ASSISTANT

## 2023-07-23 PROCEDURE — 71046 X-RAY EXAM CHEST 2 VIEWS: CPT | Mod: TC | Performed by: RADIOLOGY

## 2023-07-23 PROCEDURE — 99213 OFFICE O/P EST LOW 20 MIN: CPT | Performed by: PHYSICIAN ASSISTANT

## 2023-07-23 RX ORDER — DOXYCYCLINE HYCLATE 100 MG
100 TABLET ORAL 2 TIMES DAILY
Qty: 14 TABLET | Refills: 0 | Status: SHIPPED | OUTPATIENT
Start: 2023-07-27 | End: 2023-08-03

## 2023-07-23 RX ORDER — DEXTROMETHORPHAN POLISTIREX 30 MG/5ML
60 SUSPENSION ORAL 2 TIMES DAILY
Qty: 148 ML | Refills: 0 | Status: SHIPPED | OUTPATIENT
Start: 2023-07-23 | End: 2024-06-26

## 2023-07-23 ASSESSMENT — ENCOUNTER SYMPTOMS
FEVER: 0
SHORTNESS OF BREATH: 1
HEADACHES: 0
APPETITE CHANGE: 0
COUGH: 1
SORE THROAT: 1
RHINORRHEA: 1

## 2023-07-23 NOTE — PROGRESS NOTES
SUBJECTIVE:   Sergey Felipe is a 87 year old male presenting with a chief complaint of   Chief Complaint   Patient presents with     Urgent Care     Cough     Seen on Monday 7/17 and no improved, want xray       He is an established patient of [facility].  Patient presented on 7/17 to  for the same complaints as today.  He has been ill for a total of 10 days.  Patient requesting CXR.  That SOAP note reviewed.  She was prescribed tessalon perles and given albuterol inhaler.   Difficultly sleeping at night due to coughing.  No known lung problems.     Treatment:  Tylenol  Allergy to codeine      Review of Systems   Constitutional: Negative for appetite change and fever.   HENT: Positive for congestion, rhinorrhea and sore throat. Negative for ear pain.    Respiratory: Positive for cough and shortness of breath.    Cardiovascular: Negative for chest pain.   Neurological: Negative for headaches.   All other systems reviewed and are negative.      Past Medical History:   Diagnosis Date     Hypertension      History reviewed. No pertinent family history.  Current Outpatient Medications   Medication Sig Dispense Refill     albuterol (PROAIR HFA/PROVENTIL HFA/VENTOLIN HFA) 108 (90 Base) MCG/ACT inhaler Inhale 2 puffs into the lungs every 4 hours as needed for shortness of breath or wheezing 18 g 0     benzonatate (TESSALON) 100 MG capsule Take 1-2 capsules by mouth up to 3 times daily as needed for cough. 30 capsule 0     Cholecalciferol (VITAMIN D) 1000 UNITS capsule Take 1 capsule by mouth daily       dextromethorphan (DELSYM) 30 MG/5ML liquid Take 10 mLs (60 mg) by mouth 2 times daily 148 mL 0     [START ON 7/27/2023] doxycycline hyclate (VIBRA-TABS) 100 MG tablet Take 1 tablet (100 mg) by mouth 2 times daily for 7 days 14 tablet 0     benzonatate (TESSALON) 100 MG capsule Take 1-2 capsules (100-200 mg) by mouth 3 times daily as needed for cough (Patient not taking: Reported on 11/3/2022) 50 capsule 0      guaiFENesin-dextromethorphan (ROBITUSSIN DM) 100-10 MG/5ML syrup Take 5 mLs by mouth every 4 hours as needed for cough (Patient not taking: Reported on 11/3/2022) 118 mL 0     omeprazole (PRILOSEC) 40 MG DR capsule Take 40 mg by mouth (Patient not taking: Reported on 7/17/2023)       sucralfate (CARAFATE) 1 GM tablet TAKE 1 TABLET BY MOUTH ONCE DAILY AS NEEDED (Patient not taking: Reported on 7/17/2023)       Social History     Tobacco Use     Smoking status: Never     Smokeless tobacco: Never   Substance Use Topics     Alcohol use: No       OBJECTIVE  /72 (BP Location: Left arm, Patient Position: Sitting, Cuff Size: Adult Regular)   Pulse 80   Temp 98.4  F (36.9  C) (Tympanic)   Resp 12   Wt 63.5 kg (140 lb)   SpO2 97%   BMI 25.61 kg/m      Physical Exam  Vitals and nursing note reviewed.   Constitutional:       Appearance: Normal appearance. He is normal weight.   HENT:      Head: Normocephalic and atraumatic.      Right Ear: Ear canal and external ear normal. There is impacted cerumen.      Left Ear: Tympanic membrane, ear canal and external ear normal.      Nose: Nose normal.      Mouth/Throat:      Mouth: Mucous membranes are moist.      Pharynx: Oropharynx is clear.   Eyes:      Extraocular Movements: Extraocular movements intact.      Conjunctiva/sclera: Conjunctivae normal.   Cardiovascular:      Rate and Rhythm: Normal rate and regular rhythm.      Pulses: Normal pulses.      Heart sounds: Normal heart sounds.   Pulmonary:      Effort: Pulmonary effort is normal.      Breath sounds: Normal breath sounds.      Comments: Patient has not coughed during OV.  Musculoskeletal:      Cervical back: Normal range of motion and neck supple. No rigidity. No muscular tenderness.   Skin:     General: Skin is warm and dry.      Capillary Refill: Capillary refill takes less than 2 seconds.   Neurological:      General: No focal deficit present.      Mental Status: He is alert and oriented to person, place, and  time.   Psychiatric:         Mood and Affect: Mood normal.         Behavior: Behavior normal.         Labs:  Results for orders placed or performed in visit on 07/23/23 (from the past 24 hour(s))   XR Chest 2 Views    Narrative    EXAM: XR CHEST 2 VIEWS  LOCATION: [facility]  DATE: 07/23/2023    INDICATION: Acute cough.  COMPARISON: 09/26/2015.      Impression    IMPRESSION: Negative chest.     CBC with platelets and differential    Narrative    The following orders were created for panel order CBC with platelets and differential.  Procedure                               Abnormality         Status                     ---------                               -----------         ------                     CBC with platelets and d...[[id number]]                      Final result                 Please view results for these tests on the individual orders.   CBC with platelets and differential   Result Value Ref Range    WBC Count 10.9 4.0 - 11.0 10e3/uL    RBC Count 5.05 4.40 - 5.90 10e6/uL    Hemoglobin 15.9 13.3 - 17.7 g/dL    Hematocrit 46.4 40.0 - 53.0 %    MCV 92 78 - 100 fL    MCH 31.5 26.5 - 33.0 pg    MCHC 34.3 31.5 - 36.5 g/dL    RDW 11.7 10.0 - 15.0 %    Platelet Count 206 150 - 450 10e3/uL    % Neutrophils 65 %    % Lymphocytes 21 %    % Monocytes 11 %    % Eosinophils 3 %    % Basophils 0 %    % Immature Granulocytes 0 %    Absolute Neutrophils 7.2 1.6 - 8.3 10e3/uL    Absolute Lymphocytes 2.2 0.8 - 5.3 10e3/uL    Absolute Monocytes 1.2 0.0 - 1.3 10e3/uL    Absolute Eosinophils 0.3 0.0 - 0.7 10e3/uL    Absolute Basophils 0.0 0.0 - 0.2 10e3/uL    Absolute Immature Granulocytes 0.0 <=0.4 10e3/uL       X-Ray was done, my findings are:     ASSESSMENT:      ICD-10-CM    1. Acute cough  R05.1 CBC with platelets and differential     XR Chest 2 Views     CBC with platelets and differential      2. Bronchitis  J40 doxycycline hyclate (VIBRA-TABS) 100 MG tablet     dextromethorphan  (DELSYM) 30 MG/5ML liquid           Medical Decision Making:    Differential Diagnosis:  URI Adult/Peds:  Bronchitis-viral, Pneumonia and Viral syndrome    Serious Comorbid Conditions:  Adult:  reviewed    PLAN:    Rx for doxycycline if not improved in 4 days and delsym.  Drink plenty of water.  Discussed reasons to seek immediate medical attention.  Additionally if no improvement or worsening in one week, may follow up with PCP and/or UC.    Discussed radiograph and cbc.      Followup:    If not improving or if condition worsens, follow up with your Primary Care Provider, If not improving or if conditions worsens over the next 12-24 hours, go to the Emergency Department    There are no Patient Instructions on file for this visit.

## 2023-07-30 DIAGNOSIS — J06.9 VIRAL URI WITH COUGH: ICD-10-CM

## 2023-07-31 RX ORDER — ALBUTEROL SULFATE 90 UG/1
2 AEROSOL, METERED RESPIRATORY (INHALATION) EVERY 4 HOURS PRN
Qty: 18 G | Refills: 0 | OUTPATIENT
Start: 2023-07-31

## 2023-11-17 ENCOUNTER — ANCILLARY PROCEDURE (OUTPATIENT)
Dept: GENERAL RADIOLOGY | Facility: CLINIC | Age: 88
End: 2023-11-17
Attending: PHYSICIAN ASSISTANT
Payer: COMMERCIAL

## 2023-11-17 ENCOUNTER — OFFICE VISIT (OUTPATIENT)
Dept: URGENT CARE | Facility: URGENT CARE | Age: 88
End: 2023-11-17
Payer: COMMERCIAL

## 2023-11-17 VITALS
BODY MASS INDEX: 25.79 KG/M2 | WEIGHT: 141 LBS | OXYGEN SATURATION: 98 % | SYSTOLIC BLOOD PRESSURE: 150 MMHG | HEART RATE: 78 BPM | TEMPERATURE: 98.6 F | DIASTOLIC BLOOD PRESSURE: 69 MMHG | RESPIRATION RATE: 17 BRPM

## 2023-11-17 DIAGNOSIS — I10 ELEVATED BLOOD PRESSURE READING IN OFFICE WITH DIAGNOSIS OF HYPERTENSION: ICD-10-CM

## 2023-11-17 DIAGNOSIS — J18.9 PNEUMONIA OF LEFT LOWER LOBE DUE TO INFECTIOUS ORGANISM: Primary | ICD-10-CM

## 2023-11-17 DIAGNOSIS — R05.1 ACUTE COUGH: ICD-10-CM

## 2023-11-17 DIAGNOSIS — R50.9 FEVER, UNSPECIFIED FEVER CAUSE: ICD-10-CM

## 2023-11-17 DIAGNOSIS — R06.2 WHEEZING: ICD-10-CM

## 2023-11-17 PROCEDURE — 94640 AIRWAY INHALATION TREATMENT: CPT | Performed by: PHYSICIAN ASSISTANT

## 2023-11-17 PROCEDURE — 71046 X-RAY EXAM CHEST 2 VIEWS: CPT | Mod: TC | Performed by: RADIOLOGY

## 2023-11-17 PROCEDURE — 99214 OFFICE O/P EST MOD 30 MIN: CPT | Mod: 25 | Performed by: PHYSICIAN ASSISTANT

## 2023-11-17 RX ORDER — ALBUTEROL SULFATE 90 UG/1
1-2 AEROSOL, METERED RESPIRATORY (INHALATION) EVERY 6 HOURS PRN
Qty: 18 G | Refills: 0 | Status: SHIPPED | OUTPATIENT
Start: 2023-11-17 | End: 2023-11-27

## 2023-11-17 RX ORDER — ALBUTEROL SULFATE 0.83 MG/ML
2.5 SOLUTION RESPIRATORY (INHALATION) ONCE
Status: COMPLETED | OUTPATIENT
Start: 2023-11-17 | End: 2023-11-17

## 2023-11-17 RX ORDER — AZITHROMYCIN 250 MG/1
TABLET, FILM COATED ORAL
Qty: 6 TABLET | Refills: 0 | Status: SHIPPED | OUTPATIENT
Start: 2023-11-17 | End: 2024-06-26

## 2023-11-17 RX ORDER — AMOXICILLIN 875 MG
875 TABLET ORAL 2 TIMES DAILY
Qty: 20 TABLET | Refills: 0 | Status: CANCELLED | OUTPATIENT
Start: 2023-11-17 | End: 2023-11-27

## 2023-11-17 RX ADMIN — ALBUTEROL SULFATE 2.5 MG: 0.83 SOLUTION RESPIRATORY (INHALATION) at 17:56

## 2023-11-17 NOTE — PROGRESS NOTES
Chief Complaint   Patient presents with    Cough     Consistent since Monday, yellow mucous     Fever   Chest x-ray-I see no obvious pneumonia looks similar to chest x-ray from July 2023.    Results for orders placed or performed in visit on 11/17/23   XR Chest 2 Views     Status: None    Narrative    EXAM: XR CHEST 2 VIEWS  LOCATION: [facility]  DATE: 11/17/2023    INDICATION: Acute cough. Fever.  COMPARISON: 07/23/2023      Impression    IMPRESSION:     Streaky left basilar retrocardiac opacities, either atelectasis versus pneumonia.    No focal airspace disease in the right lung. No pleural effusion or pneumothorax.    The cardiomediastinal silhouette is unremarkable. Aortic calcifications.    Chronic right rib fractures.         ASSESSMENT:     ICD-10-CM    1. Lower respiratory infection  J22 Streptococcus A Rapid Screen w/Reflex to PCR - Clinic Collect     Symptomatic COVID-19 Virus (Coronavirus) by PCR Nose     azithromycin (ZITHROMAX Z-STEVE) 250 MG tablet     albuterol (PROAIR HFA/PROVENTIL HFA/VENTOLIN HFA) 108 (90 Base) MCG/ACT inhaler      2. Wheezing  R06.2 INHALATION/NEBULIZER TREATMENT, INITIAL     albuterol (PROVENTIL) neb solution 2.5 mg     Streptococcus A Rapid Screen w/Reflex to PCR - Clinic Collect     Symptomatic COVID-19 Virus (Coronavirus) by PCR Nose     azithromycin (ZITHROMAX Z-STEVE) 250 MG tablet     albuterol (PROAIR HFA/PROVENTIL HFA/VENTOLIN HFA) 108 (90 Base) MCG/ACT inhaler      3. Acute cough  R05.1 XR Chest 2 Views     INHALATION/NEBULIZER TREATMENT, INITIAL     albuterol (PROVENTIL) neb solution 2.5 mg     Streptococcus A Rapid Screen w/Reflex to PCR - Clinic Collect     Symptomatic COVID-19 Virus (Coronavirus) by PCR Nose     azithromycin (ZITHROMAX Z-STEVE) 250 MG tablet     albuterol (PROAIR HFA/PROVENTIL HFA/VENTOLIN HFA) 108 (90 Base) MCG/ACT inhaler      4. Fever, unspecified fever cause  R50.9 XR Chest 2 Views     INHALATION/NEBULIZER TREATMENT,  INITIAL     albuterol (PROVENTIL) neb solution 2.5 mg     Streptococcus A Rapid Screen w/Reflex to PCR - Clinic Collect     Symptomatic COVID-19 Virus (Coronavirus) by PCR Nose     azithromycin (ZITHROMAX Z-STEVE) 250 MG tablet     albuterol (PROAIR HFA/PROVENTIL HFA/VENTOLIN HFA) 108 (90 Base) MCG/ACT inhaler      5. Elevated blood pressure reading in office with diagnosis of hypertension  I10 Streptococcus A Rapid Screen w/Reflex to PCR - Clinic Collect     Symptomatic COVID-19 Virus (Coronavirus) by PCR Nose     azithromycin (ZITHROMAX Z-STEVE) 250 MG tablet     albuterol (PROAIR HFA/PROVENTIL HFA/VENTOLIN HFA) 108 (90 Base) MCG/ACT inhaler            PLAN: After neb treatment, cough much better.  Much better air movement with auscultation.  4 days of wet cough.  Will do home COVID test.  We will treat for possible pneumonia with Z-Steve and Augmentin.   Albuterol inhaler prescribed.  Have pharmacist show you how to use it.  Recheck with primary if not better by Monday, sooner as needed.  Repeat chest x-ray in 4 to 6 weeks.  I have discussed clinical findings with patient.  Side effects of medications discussed.  Symptomatic care is discussed.  I have discussed the possibility of  worsening symptoms and indication to RTC or go to the ER if they occur.  All questions are answered, patient indicates understanding of these issues and is in agreement with plan.   Patient care instructions are discussed/given at the end of visit.   Lots of rest and fluids.      Amparo Perkins PA-C      SUBJECTIVE:  88-year-old male presents for wet cough and fever for about 4 days now.  Did not take temperature but felt feverish with chills/shivering.  Grandchildren were ill with something.  No ear pain.  Some sore throat.  No vomiting or diarrhea.  History of hypertension.  No history of asthma.      Allergies   Allergen Reactions    Aspirin      Stomach pain d/t ulcer     Tylenol W/Codeine [Acetaminophen-Codeine]        Past Medical  History:   Diagnosis Date    Hypertension        Cholecalciferol (VITAMIN D) 1000 UNITS capsule, Take 1 capsule by mouth daily  dextromethorphan (DELSYM) 30 MG/5ML liquid, Take 10 mLs (60 mg) by mouth 2 times daily  albuterol (PROAIR HFA/PROVENTIL HFA/VENTOLIN HFA) 108 (90 Base) MCG/ACT inhaler, Inhale 2 puffs into the lungs every 4 hours as needed for shortness of breath or wheezing    No current facility-administered medications on file prior to visit.      Social History     Tobacco Use    Smoking status: Never    Smokeless tobacco: Never   Substance Use Topics    Alcohol use: No       ROS:  CONSTITUTIONAL: as above.  EYES: Negative for eye problems.  ENT: Neg for ST.  RESP: As above.  CV: Negative for chest pains.  GI: Negative for vomiting.  MUSCULOSKELETAL:  Negative for significant muscle or joint pains.  NEUROLOGIC: Negative for headaches.  SKIN: Negative for rash.  PSYCH: Normal mentation for age.    OBJECTIVE:  BP (!) 150/69   Pulse 78   Temp 98.6  F (37  C) (Tympanic)   Resp 17   Wt 64 kg (141 lb)   SpO2 98%   BMI 25.79 kg/m    GENERAL APPEARANCE: Healthy, alert and no distress.  EYES:Conjunctiva/sclera clear.  EARS: No cerumen.   Ear canals w/o erythema.  TM's intact w/o erythema.    NOSE/MOUTH: Nose without ulcers, erythema or lesions.  SINUSES: No maxillary sinus tenderness.  THROAT: No erythema w/o tonsillar enlargement . No exudates.  NECK: Supple, nontender, no lymphadenopathy.  RESP: Lungs clear to auscultation - no rales, rhonchi or wheezes  CV: Regular rate and rhythm, normal S1 S2, no murmur noted.  NEURO: Awake, alert    SKIN: No rashes        Amparo Perkins PA-C

## 2023-12-09 DIAGNOSIS — J06.9 VIRAL URI WITH COUGH: ICD-10-CM

## 2023-12-09 DIAGNOSIS — R50.9 FEVER, UNSPECIFIED FEVER CAUSE: ICD-10-CM

## 2023-12-09 DIAGNOSIS — I10 ELEVATED BLOOD PRESSURE READING IN OFFICE WITH DIAGNOSIS OF HYPERTENSION: ICD-10-CM

## 2023-12-09 DIAGNOSIS — R06.2 WHEEZING: ICD-10-CM

## 2023-12-09 DIAGNOSIS — R05.1 ACUTE COUGH: ICD-10-CM

## 2023-12-11 RX ORDER — ALBUTEROL SULFATE 90 UG/1
1-2 AEROSOL, METERED RESPIRATORY (INHALATION) EVERY 6 HOURS PRN
Qty: 18 G | Refills: 0 | OUTPATIENT
Start: 2023-12-11

## 2024-06-26 ENCOUNTER — OFFICE VISIT (OUTPATIENT)
Dept: OPTOMETRY | Facility: CLINIC | Age: 89
End: 2024-06-26
Payer: COMMERCIAL

## 2024-06-26 DIAGNOSIS — Z01.00 EXAMINATION OF EYES AND VISION: Primary | ICD-10-CM

## 2024-06-26 DIAGNOSIS — H02.839 DERMATOCHALASIS OF EYELID: ICD-10-CM

## 2024-06-26 DIAGNOSIS — H52.4 PRESBYOPIA: ICD-10-CM

## 2024-06-26 DIAGNOSIS — H52.223 REGULAR ASTIGMATISM OF BOTH EYES: ICD-10-CM

## 2024-06-26 DIAGNOSIS — Z96.1 PSEUDOPHAKIA OF BOTH EYES: ICD-10-CM

## 2024-06-26 PROCEDURE — 92015 DETERMINE REFRACTIVE STATE: CPT | Performed by: OPTOMETRIST

## 2024-06-26 PROCEDURE — 92014 COMPRE OPH EXAM EST PT 1/>: CPT | Performed by: OPTOMETRIST

## 2024-06-26 RX ORDER — LISINOPRIL/HYDROCHLOROTHIAZIDE 10-12.5 MG
1 TABLET ORAL DAILY
COMMUNITY
Start: 2024-02-19

## 2024-06-26 RX ORDER — OMEPRAZOLE 40 MG/1
CAPSULE, DELAYED RELEASE ORAL
COMMUNITY
Start: 2024-06-10

## 2024-06-26 ASSESSMENT — CONF VISUAL FIELD
OS_SUPERIOR_TEMPORAL_RESTRICTION: 0
COMMENTS: PLEASE RETEST
OS_INFERIOR_NASAL_RESTRICTION: 0
OD_INFERIOR_NASAL_RESTRICTION: 0
OS_INFERIOR_TEMPORAL_RESTRICTION: 0
OS_SUPERIOR_NASAL_RESTRICTION: 0
OD_INFERIOR_TEMPORAL_RESTRICTION: 0
OD_SUPERIOR_NASAL_RESTRICTION: 0
OD_SUPERIOR_TEMPORAL_RESTRICTION: 0

## 2024-06-26 ASSESSMENT — VISUAL ACUITY
CORRECTION_TYPE: GLASSES
OD_CC: 20/40
OD_SC+: -1
OD_SC: 20/70
METHOD: SNELLEN - LINEAR
OS_SC+: -1
OS_SC: 20/30
OS_CC: 20/25
OS_CC: 20/25
OD_CC: 20/30

## 2024-06-26 ASSESSMENT — KERATOMETRY
OS_AXISANGLE_DEGREES: 180
OD_K1POWER_DIOPTERS: 42.75
OS_K1POWER_DIOPTERS: 42.25
OD_AXISANGLE_DEGREES: 176
OD_K2POWER_DIOPTERS: 43.50
OS_K2POWER_DIOPTERS: 43.50
OS_AXISANGLE2_DEGREES: 090
OD_AXISANGLE2_DEGREES: 086

## 2024-06-26 ASSESSMENT — REFRACTION_WEARINGRX
OS_CYLINDER: +0.75
OD_ADD: +3.00
OD_SPHERE: -0.75
OD_CYLINDER: +0.75
OS_ADD: +3.00
OS_AXIS: 175
OS_SPHERE: -0.50
OD_AXIS: 180
SPECS_TYPE: BIFOCAL

## 2024-06-26 ASSESSMENT — CUP TO DISC RATIO
OS_RATIO: 0.3
OD_RATIO: 0.3

## 2024-06-26 ASSESSMENT — SLIT LAMP EXAM - LIDS: COMMENTS: 2+ DERMATOCHALASIS

## 2024-06-26 ASSESSMENT — EXTERNAL EXAM - LEFT EYE: OS_EXAM: NORMAL

## 2024-06-26 ASSESSMENT — TONOMETRY
IOP_METHOD: TONOPEN
OS_IOP_MMHG: 16
OD_IOP_MMHG: 16

## 2024-06-26 ASSESSMENT — REFRACTION_MANIFEST
OD_CYLINDER: +0.75
OS_AXIS: 175
OD_SPHERE: -0.75
OS_SPHERE: -0.50
OS_CYLINDER: +0.75
OS_ADD: +3.00
OD_AXIS: 180
OD_ADD: +3.00

## 2024-06-26 ASSESSMENT — EXTERNAL EXAM - RIGHT EYE: OD_EXAM: NORMAL

## 2024-06-26 NOTE — PATIENT INSTRUCTIONS
Eyeglass prescription given.    Recommend returning for dilated fundus exam. It is a more thorough exam of the retina.    Steve Chacon OD      Optometry Providers       Clinic Locations                                 Telephone Number   Dr. Sierra Michaels   [facility]  [place] [phone]     [facility] Hours:                [facility] Hours:       [facility] Hours:   [address]   [address] [facility] [address]     [address] [place] [address]   [place] [address]    [place] [address]  Phone: [phone]                    Phone: [phone]     Phone: [phone]                      Monday 8:00-6:00                          Monday 8:00-6:00                          Monday 8:00-6:00              Tuesday 8:00-6:00                          Tuesday 8:00-6:00                          Tuesday 8:00-6:00              Wednesday 8:00-6:00                  Wednesday 8:00-6:00                   Wednesday 8:00-6:00      Thursday 8:00-6:00                        Thursday 8:00-6:00                         Thursday 8:00-6:00            Friday 8:00-5:00                              Friday 8:00-5:00                              Friday 8:00-5:00    [facility] Hours:   [address] [place] [address]  [phone]    Monday 9:00-6:00  Tuesday 9:00-6:00  Wednesday 9:00-6:00  Thursday 9:00-6:00  Friday 9:00-5:00  As always, Thank you for trusting us with your health care needs!

## 2024-06-26 NOTE — LETTER
"6/26/2024      Sergey Felipe  [address]  [facility] [address]      Dear Colleague,    Thank you for referring your patient, Sergey Felipe, to the [facility]. Please see a copy of my visit note below.    Chief Complaint   Patient presents with     Annual Eye Exam      Accompanied by daughter   Last Eye Exam: 6-4-2019  Dilated Previously: Yes- would like to reschedule today since he is driving    What are you currently using to see?  glasses       Distance Vision Acuity: Satisfied with vision    Near Vision Acuity: Satisfied with vision while reading  with glasses    Eye Comfort: good  Do you use eye drops? : No  Occupation or Hobbies: none    History of cataract surgery both eyes -2002    Had BULB eval with Dr. Lockhart 2015 but cancelled surgery- not interested at this time to revisit.  \"I am too old \"    Larisa Malcolm Optometric Assistant, A.B.O.C.          Medical, surgical and family histories reviewed and updated 6/26/2024.       OBJECTIVE: See Ophthalmology exam    ASSESSMENT:    ICD-10-CM    1. Examination of eyes and vision  Z01.00 EYE EXAM (SIMPLE-NONBILLABLE)      2. Pseudophakia of both eyes  Z96.1 EYE EXAM (SIMPLE-NONBILLABLE)      3. Dermatochalasis of eyelid  H02.839 EYE EXAM (SIMPLE-NONBILLABLE)      4. Regular astigmatism of both eyes  H52.223 REFRACTION      5. Presbyopia  H52.4 REFRACTION          PLAN:     Patient Instructions   Eyeglass prescription given.    Recommend returning for dilated fundus exam. It is a more thorough exam of the retina.    Steve Chacon, OD       Again, thank you for allowing me to participate in the care of your patient.        Sincerely,        Steve Chacno, OD  "

## 2024-06-26 NOTE — PROGRESS NOTES
"Chief Complaint   Patient presents with    Annual Eye Exam      Accompanied by daughter   Last Eye Exam: 6-4-2019  Dilated Previously: Yes- would like to reschedule today since he is driving    What are you currently using to see?  glasses       Distance Vision Acuity: Satisfied with vision    Near Vision Acuity: Satisfied with vision while reading  with glasses    Eye Comfort: good  Do you use eye drops? : No  Occupation or Hobbies: none    History of cataract surgery both eyes -2002    Had BULB eval with Dr. Lockhart 2015 but cancelled surgery- not interested at this time to revisit.  \"I am too old \"    Larisa Malcolm Optometric Assistant, A.B.O.C.          Medical, surgical and family histories reviewed and updated 6/26/2024.       OBJECTIVE: See Ophthalmology exam    ASSESSMENT:    ICD-10-CM    1. Examination of eyes and vision  Z01.00 EYE EXAM (SIMPLE-NONBILLABLE)      2. Pseudophakia of both eyes  Z96.1 EYE EXAM (SIMPLE-NONBILLABLE)      3. Dermatochalasis of eyelid  H02.839 EYE EXAM (SIMPLE-NONBILLABLE)      4. Regular astigmatism of both eyes  H52.223 REFRACTION      5. Presbyopia  H52.4 REFRACTION          PLAN:     Patient Instructions   Eyeglass prescription given.    Recommend returning for dilated fundus exam. It is a more thorough exam of the retina.    Steve Chacon, OD       "

## 2024-07-11 ENCOUNTER — APPOINTMENT (OUTPATIENT)
Dept: OPTOMETRY | Facility: CLINIC | Age: 89
End: 2024-07-11
Payer: COMMERCIAL

## 2024-07-11 PROCEDURE — 92341 FIT SPECTACLES BIFOCAL: CPT | Performed by: OPTOMETRIST

## 2025-02-10 ENCOUNTER — ANCILLARY PROCEDURE (OUTPATIENT)
Dept: GENERAL RADIOLOGY | Facility: CLINIC | Age: 89
End: 2025-02-10
Attending: PHYSICIAN ASSISTANT
Payer: COMMERCIAL

## 2025-02-10 ENCOUNTER — OFFICE VISIT (OUTPATIENT)
Dept: URGENT CARE | Facility: URGENT CARE | Age: 89
End: 2025-02-10
Payer: COMMERCIAL

## 2025-02-10 VITALS
BODY MASS INDEX: 25.61 KG/M2 | OXYGEN SATURATION: 95 % | SYSTOLIC BLOOD PRESSURE: 142 MMHG | DIASTOLIC BLOOD PRESSURE: 62 MMHG | TEMPERATURE: 98.4 F | WEIGHT: 140 LBS | RESPIRATION RATE: 12 BRPM | HEART RATE: 81 BPM

## 2025-02-10 DIAGNOSIS — J22 LOWER RESPIRATORY INFECTION: Primary | ICD-10-CM

## 2025-02-10 DIAGNOSIS — J22 LOWER RESPIRATORY INFECTION: ICD-10-CM

## 2025-02-10 DIAGNOSIS — J02.9 ACUTE PHARYNGITIS, UNSPECIFIED ETIOLOGY: ICD-10-CM

## 2025-02-10 LAB
DEPRECATED S PYO AG THROAT QL EIA: NEGATIVE
FLUAV AG SPEC QL IA: NEGATIVE
FLUBV AG SPEC QL IA: NEGATIVE
S PYO DNA THROAT QL NAA+PROBE: NOT DETECTED

## 2025-02-10 PROCEDURE — T1013 SIGN LANG/ORAL INTERPRETER: HCPCS | Mod: U4

## 2025-02-10 PROCEDURE — 87651 STREP A DNA AMP PROBE: CPT | Performed by: PHYSICIAN ASSISTANT

## 2025-02-10 PROCEDURE — 99214 OFFICE O/P EST MOD 30 MIN: CPT | Performed by: PHYSICIAN ASSISTANT

## 2025-02-10 PROCEDURE — 87804 INFLUENZA ASSAY W/OPTIC: CPT | Performed by: PHYSICIAN ASSISTANT

## 2025-02-10 PROCEDURE — 71046 X-RAY EXAM CHEST 2 VIEWS: CPT | Mod: TC | Performed by: RADIOLOGY

## 2025-02-10 PROCEDURE — 87635 SARS-COV-2 COVID-19 AMP PRB: CPT | Performed by: PHYSICIAN ASSISTANT

## 2025-02-10 RX ORDER — ALBUTEROL SULFATE 90 UG/1
2 INHALANT RESPIRATORY (INHALATION) EVERY 4 HOURS PRN
Qty: 18 G | Refills: 0 | Status: SHIPPED | OUTPATIENT
Start: 2025-02-10

## 2025-02-10 RX ORDER — BENZONATATE 200 MG/1
200 CAPSULE ORAL 3 TIMES DAILY PRN
Qty: 30 CAPSULE | Refills: 0 | Status: SHIPPED | OUTPATIENT
Start: 2025-02-10

## 2025-02-10 NOTE — PROGRESS NOTES
Assessment & Plan     Lower respiratory infection  - Influenza A & B Antigen - Clinic Collect  - COVID-19 Virus (Coronavirus) by PCR Nose  - XR Chest 2 Views; Future    Acute pharyngitis, unspecified etiology  - Influenza A & B Antigen - Clinic Collect  - Streptococcus A Rapid Screen w/Reflex to PCR - Clinic Collect  - COVID-19 Virus (Coronavirus) by PCR Nose  - Group A Streptococcus PCR Throat Swab    CXR clear without consolidation or infiltrate by my read, awaiting read by radiology. Strep and influenza negative, COVID in process. Symptomatic measures discussed including fluids, rest, Tylenol, ibuprofen.    Return in about 1 week (around 2/17/2025) for visit with primary care provider if not improving.     Simin Charles PA-C  [facility]    Subjective   Sergey Felipe is a 89 year old who presents for the following health issues     Patient presents with:  Urgent Care: Using ipad for [place]  (name: May)  Cough: Sx been 3 days, took Tylenol before came here, taking cough syrup DM and taking Tylenol   Fever      HPI    Sergey presents to clinic today for evaluation of URI symptoms  Symptoms began 3 days ago  Subjective fever, chills, sweats and body aches, sore throat, cough  Cough feels like it's coming from a tickle in his throat, productive of some white mucus, with some SOB and chest tightness  Took Tylenol about 1 hour ago ad over the counter cough with minimal relief  Grandkids have similar symptoms     Review of Systems   ROS negative except as stated above.      Objective    BP (!) 142/62 (BP Location: Left arm, Patient Position: Sitting, Cuff Size: Adult Regular)   Pulse 81   Temp 98.4  F (36.9  C) (Tympanic)   Resp 12   Wt 63.5 kg (140 lb)   SpO2 95%   BMI 25.61 kg/m    Physical Exam   GENERAL: alert and no distress  EYES: Eyes grossly normal to inspection, PERRL and conjunctivae and sclerae normal  HENT: ear canals and TM's normal, nose and mouth without ulcers or  lesions  NECK: no adenopathy, no asymmetry, masses, or scars  RESP: lungs clear to auscultation - no rales, rhonchi or wheezes, no increased respiratory effort, intermittent cough  CV: regular rate and rhythm, normal S1 S2, no S3 or S4, no murmur, click or rub, no peripheral edema    Results for orders placed or performed in visit on 02/10/25   Influenza A & B Antigen - Clinic Collect     Status: Normal    Specimen: Nose; Swab   Result Value Ref Range    Influenza A antigen Negative Negative    Influenza B antigen Negative Negative    Narrative    Test results must be correlated with clinical data. If necessary, results should be confirmed by a molecular assay or viral culture.   Streptococcus A Rapid Screen w/Reflex to PCR - Clinic Collect     Status: Normal    Specimen: Throat; Swab   Result Value Ref Range    Group A Strep antigen Negative Negative

## 2025-02-11 ENCOUNTER — APPOINTMENT (OUTPATIENT)
Dept: OPTOMETRY | Facility: CLINIC | Age: 89
End: 2025-02-11
Payer: COMMERCIAL

## 2025-02-11 LAB — SARS-COV-2 RNA RESP QL NAA+PROBE: NEGATIVE

## 2025-02-11 PROCEDURE — 92341 FIT SPECTACLES BIFOCAL: CPT | Performed by: OPTOMETRIST

## 2025-02-24 DIAGNOSIS — J06.9 VIRAL URI WITH COUGH: ICD-10-CM

## 2025-02-24 RX ORDER — ALBUTEROL SULFATE 90 UG/1
2 INHALANT RESPIRATORY (INHALATION) EVERY 4 HOURS PRN
Qty: 18 G | Refills: 2 | Status: SHIPPED | OUTPATIENT
Start: 2025-02-24